# Patient Record
Sex: FEMALE | Race: BLACK OR AFRICAN AMERICAN | NOT HISPANIC OR LATINO | Employment: UNEMPLOYED | URBAN - METROPOLITAN AREA
[De-identification: names, ages, dates, MRNs, and addresses within clinical notes are randomized per-mention and may not be internally consistent; named-entity substitution may affect disease eponyms.]

---

## 2017-07-14 ENCOUNTER — HOSPITAL ENCOUNTER (EMERGENCY)
Facility: HOSPITAL | Age: 57
Discharge: HOME | End: 2017-07-14
Attending: EMERGENCY MEDICINE | Admitting: EMERGENCY MEDICINE
Payer: COMMERCIAL

## 2017-07-14 VITALS
SYSTOLIC BLOOD PRESSURE: 190 MMHG | HEART RATE: 66 BPM | RESPIRATION RATE: 16 BRPM | DIASTOLIC BLOOD PRESSURE: 85 MMHG | WEIGHT: 268.96 LBS | OXYGEN SATURATION: 99 % | TEMPERATURE: 98.5 F

## 2017-07-14 DIAGNOSIS — E16.2 HYPOGLYCEMIA: Primary | ICD-10-CM

## 2017-07-14 LAB
ALBUMIN SERPL BCP-MCNC: 3.4 G/DL (ref 3.5–5)
ALP SERPL-CCNC: 118 U/L (ref 46–116)
ALT SERPL W P-5'-P-CCNC: 18 U/L (ref 12–78)
ANION GAP SERPL CALCULATED.3IONS-SCNC: 11 MMOL/L (ref 4–13)
AST SERPL W P-5'-P-CCNC: 16 U/L (ref 5–45)
BASOPHILS # BLD AUTO: 0.04 THOUSANDS/ΜL (ref 0–0.1)
BASOPHILS NFR BLD AUTO: 0 % (ref 0–1)
BILIRUB SERPL-MCNC: 0.2 MG/DL (ref 0.2–1)
BUN SERPL-MCNC: 79 MG/DL (ref 5–25)
CALCIUM SERPL-MCNC: 9.4 MG/DL (ref 8.3–10.1)
CHLORIDE SERPL-SCNC: 108 MMOL/L (ref 100–108)
CO2 SERPL-SCNC: 21 MMOL/L (ref 21–32)
CREAT SERPL-MCNC: 5.58 MG/DL (ref 0.6–1.3)
EOSINOPHIL # BLD AUTO: 0.17 THOUSAND/ΜL (ref 0–0.61)
EOSINOPHIL NFR BLD AUTO: 2 % (ref 0–6)
ERYTHROCYTE [DISTWIDTH] IN BLOOD BY AUTOMATED COUNT: 13.6 % (ref 11.6–15.1)
GFR SERPL CREATININE-BSD FRML MDRD: 9.5 ML/MIN/1.73SQ M
GLUCOSE SERPL-MCNC: 141 MG/DL (ref 65–140)
HCT VFR BLD AUTO: 34.2 % (ref 34.8–46.1)
HGB BLD-MCNC: 10.5 G/DL (ref 11.5–15.4)
LYMPHOCYTES # BLD AUTO: 2.02 THOUSANDS/ΜL (ref 0.6–4.47)
LYMPHOCYTES NFR BLD AUTO: 22 % (ref 14–44)
MCH RBC QN AUTO: 28.6 PG (ref 26.8–34.3)
MCHC RBC AUTO-ENTMCNC: 30.7 G/DL (ref 31.4–37.4)
MCV RBC AUTO: 93 FL (ref 82–98)
MONOCYTES # BLD AUTO: 0.45 THOUSAND/ΜL (ref 0.17–1.22)
MONOCYTES NFR BLD AUTO: 5 % (ref 4–12)
NEUTROPHILS # BLD AUTO: 6.59 THOUSANDS/ΜL (ref 1.85–7.62)
NEUTS SEG NFR BLD AUTO: 71 % (ref 43–75)
NRBC BLD AUTO-RTO: 0 /100 WBCS
PLATELET # BLD AUTO: 250 THOUSANDS/UL (ref 149–390)
PMV BLD AUTO: 10.2 FL (ref 8.9–12.7)
POTASSIUM SERPL-SCNC: 5.1 MMOL/L (ref 3.5–5.3)
PROT SERPL-MCNC: 7.9 G/DL (ref 6.4–8.2)
RBC # BLD AUTO: 3.67 MILLION/UL (ref 3.81–5.12)
SODIUM SERPL-SCNC: 140 MMOL/L (ref 136–145)
WBC # BLD AUTO: 9.32 THOUSAND/UL (ref 4.31–10.16)

## 2017-07-14 PROCEDURE — 36415 COLL VENOUS BLD VENIPUNCTURE: CPT | Performed by: PHYSICIAN ASSISTANT

## 2017-07-14 PROCEDURE — 85025 COMPLETE CBC W/AUTO DIFF WBC: CPT | Performed by: PHYSICIAN ASSISTANT

## 2017-07-14 PROCEDURE — 93005 ELECTROCARDIOGRAM TRACING: CPT | Performed by: PHYSICIAN ASSISTANT

## 2017-07-14 PROCEDURE — 99284 EMERGENCY DEPT VISIT MOD MDM: CPT

## 2017-07-14 PROCEDURE — 80053 COMPREHEN METABOLIC PANEL: CPT | Performed by: PHYSICIAN ASSISTANT

## 2017-07-14 RX ORDER — AMLODIPINE BESYLATE 5 MG/1
5 TABLET ORAL DAILY
COMMUNITY
End: 2017-11-29 | Stop reason: HOSPADM

## 2017-07-14 RX ORDER — ATORVASTATIN CALCIUM 40 MG/1
40 TABLET, FILM COATED ORAL DAILY
COMMUNITY
End: 2017-11-29 | Stop reason: HOSPADM

## 2017-07-14 RX ORDER — CARVEDILOL 6.25 MG/1
6.25 TABLET ORAL 2 TIMES DAILY WITH MEALS
COMMUNITY
End: 2017-11-29 | Stop reason: HOSPADM

## 2017-07-18 LAB
ATRIAL RATE: 64 BPM
P AXIS: 71 DEGREES
PR INTERVAL: 170 MS
QRS AXIS: 15 DEGREES
QRSD INTERVAL: 84 MS
QT INTERVAL: 416 MS
QTC INTERVAL: 429 MS
T WAVE AXIS: 54 DEGREES
VENTRICULAR RATE: 64 BPM

## 2017-11-25 ENCOUNTER — HOSPITAL ENCOUNTER (INPATIENT)
Facility: HOSPITAL | Age: 57
LOS: 4 days | Discharge: HOME HEALTH | DRG: 045 | End: 2017-11-29
Attending: EMERGENCY MEDICINE | Admitting: ANESTHESIOLOGY
Payer: COMMERCIAL

## 2017-11-25 ENCOUNTER — APPOINTMENT (EMERGENCY)
Dept: CT IMAGING | Facility: HOSPITAL | Age: 57
DRG: 045 | End: 2017-11-25
Payer: COMMERCIAL

## 2017-11-25 ENCOUNTER — APPOINTMENT (EMERGENCY)
Dept: RADIOLOGY | Facility: HOSPITAL | Age: 57
DRG: 045 | End: 2017-11-25
Payer: COMMERCIAL

## 2017-11-25 DIAGNOSIS — N17.9 AKI (ACUTE KIDNEY INJURY) (HCC): ICD-10-CM

## 2017-11-25 DIAGNOSIS — E87.5 ACUTE HYPERKALEMIA: ICD-10-CM

## 2017-11-25 DIAGNOSIS — N17.9 ACUTE ON CHRONIC KIDNEY FAILURE (HCC): ICD-10-CM

## 2017-11-25 DIAGNOSIS — N18.9 ACUTE ON CHRONIC KIDNEY FAILURE (HCC): ICD-10-CM

## 2017-11-25 DIAGNOSIS — N17.9 ACUTE KIDNEY FAILURE (HCC): ICD-10-CM

## 2017-11-25 DIAGNOSIS — I63.9 STROKE (CEREBRUM) (HCC): ICD-10-CM

## 2017-11-25 DIAGNOSIS — I63.9 CVA (CEREBRAL VASCULAR ACCIDENT) (HCC): Primary | ICD-10-CM

## 2017-11-25 PROBLEM — E78.5 HLD (HYPERLIPIDEMIA): Status: ACTIVE | Noted: 2017-11-25

## 2017-11-25 PROBLEM — N39.0 UTI (URINARY TRACT INFECTION): Status: ACTIVE | Noted: 2017-11-25

## 2017-11-25 PROBLEM — I10 ESSENTIAL HYPERTENSION: Status: ACTIVE | Noted: 2017-11-25

## 2017-11-25 PROBLEM — E10.9 TYPE 1 DIABETES MELLITUS (HCC): Status: ACTIVE | Noted: 2017-11-25

## 2017-11-25 LAB
ALBUMIN SERPL BCP-MCNC: 3.5 G/DL (ref 3.5–5)
ALP SERPL-CCNC: 172 U/L (ref 46–116)
ALT SERPL W P-5'-P-CCNC: 17 U/L (ref 12–78)
ANION GAP SERPL CALCULATED.3IONS-SCNC: 12 MMOL/L (ref 4–13)
APTT PPP: 28 SECONDS (ref 23–35)
AST SERPL W P-5'-P-CCNC: 29 U/L (ref 5–45)
BACTERIA UR QL AUTO: ABNORMAL /HPF
BASOPHILS # BLD AUTO: 0.04 THOUSANDS/ΜL (ref 0–0.1)
BASOPHILS NFR BLD AUTO: 0 % (ref 0–1)
BILIRUB SERPL-MCNC: 0.4 MG/DL (ref 0.2–1)
BILIRUB UR QL STRIP: NEGATIVE
BUN SERPL-MCNC: 58 MG/DL (ref 5–25)
CALCIUM SERPL-MCNC: 9.4 MG/DL (ref 8.3–10.1)
CHLORIDE SERPL-SCNC: 107 MMOL/L (ref 100–108)
CHLORIDE UR-SCNC: 101 MMOL/L (ref 10–330)
CHOLEST SERPL-MCNC: 276 MG/DL (ref 50–200)
CLARITY UR: ABNORMAL
CLARITY, POC: CLEAR
CO2 SERPL-SCNC: 23 MMOL/L (ref 21–32)
COLOR UR: COLORLESS
COLOR, POC: YELLOW
CREAT SERPL-MCNC: 6.35 MG/DL (ref 0.6–1.3)
CREAT UR-MCNC: 34.1 MG/DL
CREAT UR-MCNC: 37.3 MG/DL
EOSINOPHIL # BLD AUTO: 0.09 THOUSAND/ΜL (ref 0–0.61)
EOSINOPHIL NFR BLD AUTO: 1 % (ref 0–6)
ERYTHROCYTE [DISTWIDTH] IN BLOOD BY AUTOMATED COUNT: 13.6 % (ref 11.6–15.1)
EXT BILIRUBIN, UA: NEGATIVE
EXT BLOOD URINE: ABNORMAL
EXT GLUCOSE, UA: 250
EXT KETONES: ABNORMAL
EXT NITRITE, UA: NEGATIVE
EXT PH, UA: 7.5
EXT PROTEIN, UA: 2000
EXT SPECIFIC GRAVITY, UA: 1.01
EXT UROBILINOGEN: NEGATIVE
GFR SERPL CREATININE-BSD FRML MDRD: 8 ML/MIN/1.73SQ M
GLUCOSE SERPL-MCNC: 161 MG/DL (ref 65–140)
GLUCOSE SERPL-MCNC: 165 MG/DL (ref 65–140)
GLUCOSE SERPL-MCNC: 206 MG/DL (ref 65–140)
GLUCOSE SERPL-MCNC: 245 MG/DL (ref 65–140)
GLUCOSE UR STRIP-MCNC: ABNORMAL MG/DL
HCT VFR BLD AUTO: 33.7 % (ref 34.8–46.1)
HDLC SERPL-MCNC: 40 MG/DL (ref 40–60)
HGB BLD-MCNC: 10.8 G/DL (ref 11.5–15.4)
HGB UR QL STRIP.AUTO: ABNORMAL
INR PPP: 1 (ref 0.86–1.16)
KETONES UR STRIP-MCNC: NEGATIVE MG/DL
LDLC SERPL CALC-MCNC: 176 MG/DL (ref 0–100)
LEUKOCYTE ESTERASE UR QL STRIP: ABNORMAL
LYMPHOCYTES # BLD AUTO: 1.73 THOUSANDS/ΜL (ref 0.6–4.47)
LYMPHOCYTES NFR BLD AUTO: 17 % (ref 14–44)
MCH RBC QN AUTO: 28.9 PG (ref 26.8–34.3)
MCHC RBC AUTO-ENTMCNC: 32 G/DL (ref 31.4–37.4)
MCV RBC AUTO: 90 FL (ref 82–98)
MICROALBUMIN UR-MCNC: 2000 MG/L (ref 0–20)
MICROALBUMIN/CREAT 24H UR: 5865 MG/G CREATININE (ref 0–30)
MONOCYTES # BLD AUTO: 0.35 THOUSAND/ΜL (ref 0.17–1.22)
MONOCYTES NFR BLD AUTO: 4 % (ref 4–12)
NEUTROPHILS # BLD AUTO: 7.66 THOUSANDS/ΜL (ref 1.85–7.62)
NEUTS SEG NFR BLD AUTO: 77 % (ref 43–75)
NITRITE UR QL STRIP: NEGATIVE
NON-SQ EPI CELLS URNS QL MICRO: ABNORMAL /HPF
NRBC BLD AUTO-RTO: 0 /100 WBCS
OSMOLALITY UR: 305 MMOL/KG
PH UR STRIP.AUTO: 7 [PH] (ref 4.5–8)
PLATELET # BLD AUTO: 227 THOUSANDS/UL (ref 149–390)
PLATELET # BLD AUTO: 259 THOUSANDS/UL (ref 149–390)
PMV BLD AUTO: 10 FL (ref 8.9–12.7)
PMV BLD AUTO: 9.3 FL (ref 8.9–12.7)
POTASSIUM SERPL-SCNC: 6.4 MMOL/L (ref 3.5–5.3)
POTASSIUM UR-SCNC: 14.6 MMOL/L (ref 1–300)
PROT SERPL-MCNC: 8 G/DL (ref 6.4–8.2)
PROT UR STRIP-MCNC: >=300 MG/DL
PROTHROMBIN TIME: 13.4 SECONDS (ref 12.1–14.4)
RBC # BLD AUTO: 3.74 MILLION/UL (ref 3.81–5.12)
RBC #/AREA URNS AUTO: ABNORMAL /HPF
SODIUM 24H UR-SCNC: 95 MOL/L
SODIUM 24H UR-SCNC: 99 MOL/L
SODIUM SERPL-SCNC: 142 MMOL/L (ref 136–145)
SP GR UR STRIP.AUTO: 1.01 (ref 1–1.03)
TRIGL SERPL-MCNC: 300 MG/DL
TROPONIN I SERPL-MCNC: 0.02 NG/ML
TSH SERPL DL<=0.05 MIU/L-ACNC: 4.7 UIU/ML (ref 0.36–3.74)
UROBILINOGEN UR QL STRIP.AUTO: 0.2 E.U./DL
UUN 24H UR-MCNC: 199 MG/DL
WBC # BLD AUTO: 9.92 THOUSAND/UL (ref 4.31–10.16)
WBC # BLD EST: ABNORMAL 10*3/UL
WBC #/AREA URNS AUTO: ABNORMAL /HPF
WBC CLUMPS # UR AUTO: PRESENT /UL

## 2017-11-25 PROCEDURE — 84540 ASSAY OF URINE/UREA-N: CPT | Performed by: INTERNAL MEDICINE

## 2017-11-25 PROCEDURE — 93005 ELECTROCARDIOGRAM TRACING: CPT | Performed by: EMERGENCY MEDICINE

## 2017-11-25 PROCEDURE — 84300 ASSAY OF URINE SODIUM: CPT | Performed by: INTERNAL MEDICINE

## 2017-11-25 PROCEDURE — 82570 ASSAY OF URINE CREATININE: CPT | Performed by: NURSE PRACTITIONER

## 2017-11-25 PROCEDURE — 84133 ASSAY OF URINE POTASSIUM: CPT | Performed by: NURSE PRACTITIONER

## 2017-11-25 PROCEDURE — 36415 COLL VENOUS BLD VENIPUNCTURE: CPT | Performed by: EMERGENCY MEDICINE

## 2017-11-25 PROCEDURE — 81001 URINALYSIS AUTO W/SCOPE: CPT | Performed by: INTERNAL MEDICINE

## 2017-11-25 PROCEDURE — 85610 PROTHROMBIN TIME: CPT | Performed by: EMERGENCY MEDICINE

## 2017-11-25 PROCEDURE — 82948 REAGENT STRIP/BLOOD GLUCOSE: CPT

## 2017-11-25 PROCEDURE — 82436 ASSAY OF URINE CHLORIDE: CPT | Performed by: NURSE PRACTITIONER

## 2017-11-25 PROCEDURE — 86705 HEP B CORE ANTIBODY IGM: CPT | Performed by: INTERNAL MEDICINE

## 2017-11-25 PROCEDURE — 84300 ASSAY OF URINE SODIUM: CPT | Performed by: NURSE PRACTITIONER

## 2017-11-25 PROCEDURE — 86704 HEP B CORE ANTIBODY TOTAL: CPT | Performed by: INTERNAL MEDICINE

## 2017-11-25 PROCEDURE — 80061 LIPID PANEL: CPT | Performed by: NURSE PRACTITIONER

## 2017-11-25 PROCEDURE — 82043 UR ALBUMIN QUANTITATIVE: CPT | Performed by: INTERNAL MEDICINE

## 2017-11-25 PROCEDURE — 86803 HEPATITIS C AB TEST: CPT | Performed by: INTERNAL MEDICINE

## 2017-11-25 PROCEDURE — 71020 HB CHEST X-RAY 2VW FRONTAL&LATL: CPT

## 2017-11-25 PROCEDURE — 87086 URINE CULTURE/COLONY COUNT: CPT | Performed by: NURSE PRACTITIONER

## 2017-11-25 PROCEDURE — 81002 URINALYSIS NONAUTO W/O SCOPE: CPT | Performed by: EMERGENCY MEDICINE

## 2017-11-25 PROCEDURE — 70450 CT HEAD/BRAIN W/O DYE: CPT

## 2017-11-25 PROCEDURE — 85049 AUTOMATED PLATELET COUNT: CPT | Performed by: NURSE PRACTITIONER

## 2017-11-25 PROCEDURE — 96360 HYDRATION IV INFUSION INIT: CPT

## 2017-11-25 PROCEDURE — 84484 ASSAY OF TROPONIN QUANT: CPT | Performed by: EMERGENCY MEDICINE

## 2017-11-25 PROCEDURE — 83935 ASSAY OF URINE OSMOLALITY: CPT | Performed by: NURSE PRACTITIONER

## 2017-11-25 PROCEDURE — 87340 HEPATITIS B SURFACE AG IA: CPT | Performed by: INTERNAL MEDICINE

## 2017-11-25 PROCEDURE — 80053 COMPREHEN METABOLIC PANEL: CPT | Performed by: EMERGENCY MEDICINE

## 2017-11-25 PROCEDURE — 85730 THROMBOPLASTIN TIME PARTIAL: CPT | Performed by: EMERGENCY MEDICINE

## 2017-11-25 PROCEDURE — 86706 HEP B SURFACE ANTIBODY: CPT | Performed by: INTERNAL MEDICINE

## 2017-11-25 PROCEDURE — 99285 EMERGENCY DEPT VISIT HI MDM: CPT

## 2017-11-25 PROCEDURE — 82570 ASSAY OF URINE CREATININE: CPT | Performed by: INTERNAL MEDICINE

## 2017-11-25 PROCEDURE — 84443 ASSAY THYROID STIM HORMONE: CPT | Performed by: EMERGENCY MEDICINE

## 2017-11-25 PROCEDURE — 85025 COMPLETE CBC W/AUTO DIFF WBC: CPT | Performed by: EMERGENCY MEDICINE

## 2017-11-25 RX ORDER — CARVEDILOL 6.25 MG/1
6.25 TABLET ORAL 2 TIMES DAILY WITH MEALS
Status: DISCONTINUED | OUTPATIENT
Start: 2017-11-25 | End: 2017-11-26

## 2017-11-25 RX ORDER — ATORVASTATIN CALCIUM 40 MG/1
80 TABLET, FILM COATED ORAL
Status: DISCONTINUED | OUTPATIENT
Start: 2017-11-25 | End: 2017-11-29 | Stop reason: HOSPADM

## 2017-11-25 RX ORDER — SODIUM POLYSTYRENE SULFONATE 15 G/60ML
30 SUSPENSION ORAL; RECTAL ONCE
Status: COMPLETED | OUTPATIENT
Start: 2017-11-25 | End: 2017-11-25

## 2017-11-25 RX ORDER — AMLODIPINE BESYLATE 5 MG/1
5 TABLET ORAL DAILY
Status: DISCONTINUED | OUTPATIENT
Start: 2017-11-26 | End: 2017-11-26

## 2017-11-25 RX ORDER — HYDRALAZINE HYDROCHLORIDE 20 MG/ML
10 INJECTION INTRAMUSCULAR; INTRAVENOUS ONCE
Status: COMPLETED | OUTPATIENT
Start: 2017-11-25 | End: 2017-11-25

## 2017-11-25 RX ORDER — HEPARIN SODIUM 5000 [USP'U]/ML
7500 INJECTION, SOLUTION INTRAVENOUS; SUBCUTANEOUS EVERY 8 HOURS SCHEDULED
Status: DISCONTINUED | OUTPATIENT
Start: 2017-11-25 | End: 2017-11-29 | Stop reason: HOSPADM

## 2017-11-25 RX ORDER — CIPROFLOXACIN 250 MG/1
250 TABLET, FILM COATED ORAL
Status: DISCONTINUED | OUTPATIENT
Start: 2017-11-25 | End: 2017-11-28

## 2017-11-25 RX ORDER — CIPROFLOXACIN 500 MG/1
500 TABLET, FILM COATED ORAL EVERY 12 HOURS SCHEDULED
Status: DISCONTINUED | OUTPATIENT
Start: 2017-11-25 | End: 2017-11-25

## 2017-11-25 RX ORDER — CLONIDINE HYDROCHLORIDE 0.1 MG/1
0.1 TABLET ORAL ONCE
Status: COMPLETED | OUTPATIENT
Start: 2017-11-25 | End: 2017-11-25

## 2017-11-25 RX ORDER — DEXTROSE MONOHYDRATE 25 G/50ML
25 INJECTION, SOLUTION INTRAVENOUS ONCE
Status: COMPLETED | OUTPATIENT
Start: 2017-11-25 | End: 2017-11-25

## 2017-11-25 RX ORDER — CHLORHEXIDINE GLUCONATE 0.12 MG/ML
15 RINSE ORAL EVERY 12 HOURS SCHEDULED
Status: DISCONTINUED | OUTPATIENT
Start: 2017-11-25 | End: 2017-11-29 | Stop reason: HOSPADM

## 2017-11-25 RX ADMIN — CHLORHEXIDINE GLUCONATE 15 ML: 1.2 RINSE ORAL at 20:42

## 2017-11-25 RX ADMIN — CALCIUM GLUCONATE 1 G: 94 INJECTION, SOLUTION INTRAVENOUS at 17:57

## 2017-11-25 RX ADMIN — HEPARIN SODIUM 7500 UNITS: 5000 INJECTION, SOLUTION INTRAVENOUS; SUBCUTANEOUS at 18:11

## 2017-11-25 RX ADMIN — HEPARIN SODIUM 7500 UNITS: 5000 INJECTION, SOLUTION INTRAVENOUS; SUBCUTANEOUS at 22:26

## 2017-11-25 RX ADMIN — CARVEDILOL 6.25 MG: 6.25 TABLET, FILM COATED ORAL at 18:06

## 2017-11-25 RX ADMIN — SODIUM POLYSTYRENE SULFONATE 30 G: 15 SUSPENSION ORAL; RECTAL at 16:23

## 2017-11-25 RX ADMIN — INSULIN LISPRO 1 UNITS: 100 INJECTION, SOLUTION INTRAVENOUS; SUBCUTANEOUS at 18:54

## 2017-11-25 RX ADMIN — ATORVASTATIN CALCIUM 80 MG: 40 TABLET, FILM COATED ORAL at 18:07

## 2017-11-25 RX ADMIN — CIPROFLOXACIN HYDROCHLORIDE 250 MG: 250 TABLET, FILM COATED ORAL at 18:06

## 2017-11-25 RX ADMIN — SODIUM CHLORIDE 1000 ML: 0.9 INJECTION, SOLUTION INTRAVENOUS at 12:46

## 2017-11-25 RX ADMIN — INSULIN DETEMIR 10 UNITS: 100 INJECTION, SOLUTION SUBCUTANEOUS at 22:27

## 2017-11-25 RX ADMIN — INSULIN LISPRO 1 UNITS: 100 INJECTION, SOLUTION INTRAVENOUS; SUBCUTANEOUS at 22:27

## 2017-11-25 RX ADMIN — INSULIN HUMAN 10 UNITS: 100 INJECTION, SOLUTION PARENTERAL at 17:59

## 2017-11-25 RX ADMIN — HYDRALAZINE HYDROCHLORIDE 10 MG: 20 INJECTION INTRAMUSCULAR; INTRAVENOUS at 21:00

## 2017-11-25 RX ADMIN — CLONIDINE HYDROCHLORIDE 0.1 MG: 0.1 TABLET ORAL at 16:21

## 2017-11-25 RX ADMIN — DEXTROSE MONOHYDRATE 25 ML: 500 INJECTION PARENTERAL at 17:59

## 2017-11-25 NOTE — H&P
History and Physical - Critical Care   Orlando Lucas 59 y o  female MRN: [id number]  Unit/Bed#: ED 25 Encounter: [id number]    Reason for Admission / Chief Complaint:  Right upper extremity weakness    History of Present Illness:  Orlando Lucas is a 59 y o  female with past medical history significant for diabetes on insulin, hyperlipidemia, and hypertension who presents with a one-week history of right upper extremity weakness and no associated symptoms  During her emergency room evaluation, it was discovered that she has acute kidney injury on CKD of unknown severity, and hyperkalemia  She is hemodynamically stable and her EKG does not manifest her hyperkalemia  CT brain revealed a subacute left parietal stroke in evolution  Patient states she last saw her PCP 4 months ago, but is unsure of her name  She is unable to tell me her normal glucose range or medications  History obtained from chart review, the patient and   Patient denies any knowledge of atrial fibrillation  Past Medical History:  Past Medical History:   Diagnosis Date    [facility]     CKD (chronic kidney disease)     Diabetes mellitus ([facility] )     type II    Hyperlipidemia     Hypertension        Past Surgical History:  No past surgical history on file  Past Family History:  No family history on file      Social History:  History   Smoking Status    Never Smoker   Smokeless Tobacco    Not on file     History   Alcohol Use No     History   Drug Use No     Marital Status: /Civil Union      Medications:  Current Facility-Administered Medications   Medication Dose Route Frequency    calcium gluconate 1 g in sodium chloride 0 9 % 100 mL IVPB  1 g Intravenous Once    chlorhexidine (PERIDEX) 0 12 % oral rinse 15 mL  15 mL Swish & Spit Q12H [address]    dextrose 50 % IV solution 25 mL  25 mL Intravenous Once    heparin (porcine) subcutaneous injection 7,500 Units  7,500 Units Subcutaneous Q8H [address]    insulin regular (HumuLIN R,NovoLIN R) injection 10 Units  10 Units Subcutaneous Once     Home medications:  Prior to Admission medications    Medication Sig Start Date End Date Taking? Authorizing Provider   amLODIPine (NORVASC) 5 mg tablet Take 5 mg by mouth daily   Yes Historical Provider, MD   atorvastatin (LIPITOR) 40 mg tablet Take 40 mg by mouth daily   Yes Historical Provider, MD   carvedilol (COREG) 6 25 mg tablet Take 6 25 mg by mouth 2 (two) times a day with meals   Yes Historical Provider, MD   insulin detemir (LEVEMIR) 100 units/mL subcutaneous injection Inject 10 Units under the skin daily at bedtime   Yes Historical Provider, MD   insulin lispro (HumaLOG) 100 units/mL injection Inject under the skin 3 (three) times a day before meals   Yes Historical Provider, MD     Allergies:  No Known Allergies    ROS:   Review of Systems   HENT: Negative  Eyes: Negative  Respiratory: Negative  Cardiovascular: Negative  Gastrointestinal: Negative  Endocrine: Negative  Genitourinary: Negative  Musculoskeletal: Negative  Skin: Negative  Allergic/Immunologic: Negative  Neurological: Positive for weakness  Hematological: Negative  Psychiatric/Behavioral: Positive for decreased concentration  Vitals:  Vitals:    17 1132 17 1136 17 1530 17 1625   BP:  112/78 (!) 197/76 (!) 247/111   Pulse:   73 72   Resp:   19 18   Temp: 99 6 °F (37 6 °C)      TempSrc: Oral      SpO2:   99% 99%   Weight:       Height:         Temperature:   Temp (24hrs), Av 6 °F (37 6 °C), Min:99 6 °F (37 6 °C), Max:99 6 °F (37 6 °C)    Current Temperature: 99 6 °F (37 6 °C)    Weights:   IBW: 73 1 kg  Body mass index is 37 91 kg/m²      Hemodynamic Monitoring:  N/A     Non-Invasive/Invasive Ventilation Settings:  Respiratory    Lab Data (Last 4 hours)    None         O2/Vent Data (Last 4 hours)    None              No results found for: PHART, XRO6XPG, PO2ART, NYB7MZT, Y0SRMYEV, BEART, SOURCE  SpO2: SpO2: 99 %     Physical Exam:  Physical Exam   Constitutional: She is oriented to person, place, and time  She appears well-developed and well-nourished  No distress  HENT:   Head: Normocephalic and atraumatic  Nose: Nose normal    Mouth/Throat: Oropharynx is clear and moist    Eyes: Conjunctivae and EOM are normal  Pupils are equal, round, and reactive to light  No scleral icterus  Neck: Normal range of motion  Neck supple  No JVD present  No tracheal deviation present  Cardiovascular: Normal rate, regular rhythm, normal heart sounds and intact distal pulses  Exam reveals no gallop and no friction rub  No murmur heard  Pulmonary/Chest: Effort normal and breath sounds normal    Abdominal: Soft  Bowel sounds are normal  She exhibits no distension  There is no tenderness  There is no rebound and no guarding  Musculoskeletal: Normal range of motion  She exhibits no edema, tenderness or deformity  Neurological: She is alert and oriented to person, place, and time  She has normal strength  She displays no tremor  No cranial nerve deficit or sensory deficit  She exhibits normal muscle tone  She displays no seizure activity  Coordination and gait normal  GCS eye subscore is 4  GCS verbal subscore is 5  GCS motor subscore is 6  She displays no Babinski's sign on the right side  She displays no Babinski's sign on the left side  Slow to respond to questions at times  Skin: Skin is warm and dry  No rash noted  No pallor  Psychiatric: Her speech is normal and behavior is normal  Thought content normal  Her affect is blunt  Nursing note and vitals reviewed        Labs:    Results from last 7 days  Lab Units 11/25/17  1218   WBC Thousand/uL 9 92   HEMOGLOBIN g/dL 10 8*   HEMATOCRIT % 33 7*   PLATELETS Thousands/uL 259   NEUTROS PCT % 77*   MONOS PCT % 4      Results from last 7 days  Lab Units 11/25/17  1245   SODIUM mmol/L 142   POTASSIUM mmol/L 6 4*   CHLORIDE mmol/L 107   CO2 mmol/L 23 BUN mg/dL 58*   CREATININE mg/dL 6 35*   CALCIUM mg/dL 9 4   TOTAL PROTEIN g/dL 8 0   BILIRUBIN TOTAL mg/dL 0 40   ALK PHOS U/L 172*   ALT U/L 17   AST U/L 29   GLUCOSE RANDOM mg/dL 245*                Results from last 7 days  Lab Units 11/25/17  1218   INR  1 00   PTT seconds 28           0  Lab Value Date/Time   TROPONINI 0 02 11/25/2017 1218       Imaging:  XR chest 2 views   Final Result      No active pulmonary disease  Workstation performed: [id number]         CT head without contrast   Final Result      Apparent evolving nonhemorrhagic left posterior parietal infarct  Old left basal ganglia infarct with adjacent white matter changes of uncertain chronicity  Workstation performed: [id number]         US retroperitoneal complete    (Results Pending)   IR consult    (Results Pending)   VAS carotid complete study    (Results Pending)   VAS lower limb venous duplex study, complete bilateral    (Results Pending)      I have personally reviewed pertinent reports  EKG: SR with PACs rate 80  No STEMI  This was personally reviewed by myself  Micro:  No results found for: Lily Stevens Lobe    ______________________________________________________________________    Assessment:   Patient Active Problem List   Diagnosis    [facility] on [facility]    Hyperkalemia    Essential hypertension    HLD (hyperlipidemia)    Type 1 diabetes mellitus (HCC)    UTI (urinary tract infection)    Stroke (cerebrum) ([facility] )           Plan:      Neuro: Will place neuro consultation in for the morning  No role for tPA at present given the duration of time between onset and presentation  Serial neuro exams  CV:  Sinus rhythm with PACs on the monitor  Continue to monitor for arrhythmias and/or AFib  Control hypertension with parental agents once IV access established  Single dose of p o  clonidine given for reduction at present    Prevailing blood pressure 250/150, recommend no more than 25% reduction  Pulm:  No active issues at present  Encourage incentive spirometry to prevent atelectasis  Mobilize out of bed as able  GI:  No active issues at present  Tolerating p o  :  UTI present on admission  Will start Cipro p o  for said infection  Will send urine culture  Acute on chronic renal failure being addressed by Nephrology  Extensive discussion with patient and her  in regards to the expectations of hemodialysis  Advised them that this may be a long-term process, however will require further workup to evaluate for same  Patient is unsure that she wants to make the commitment to long-term dialysis  Provided her the option of a short-term dialysis to determine ability to recover, possibility of long-term dialysis, or absence from dialysis entirely  Patient wishes to think about her options overnight, determining quality of life versus prolonging her life  She likes to travel and feels that dialysis may not be compatible with her spontaneous lifestyle  F/E/N:  Gentle hydration once IV established  ID:  Cipro for UTI  Monitor cultures  Heme:  Hemoglobin and platelets stable  Endo:  AC and HS sugars, insulin protocol  Elevated TSH, awaiting T4 reflex  Will defer treating for hypothyroidism, as this may represent sick euthyroid  Msk/Skin:  No issues identified  Disposition:  Admit to step-down    Counseling / Coordination of Care  Total Critical Care time spent 45 minutes excluding procedures, teaching and family updates  ______________________________________________________________________    VTE Pharmacologic Prophylaxis: Heparin  VTE Mechanical Prophylaxis: sequential compression device    Invasive lines and devices:   Invasive Devices          No matching active lines, drains, or airways          Code Status: Level 1 - Full Code  POA:    POLST:      Given critical illness, patient length of stay will require greater than two midnights  Portions of the record may have been created with voice recognition software  Occasional wrong word or "sound a like" substitutions may have occurred due to the inherent limitations of voice recognition software  Read the chart carefully and recognize, using context, where substitutions have occurred        KATIE Ramsey

## 2017-11-25 NOTE — ED NOTES
2 IV attempts unsuccessful, Dr Roge Childress also tried via ultrasound without success        Jose Clements, RN  11/25/17 6599

## 2017-11-25 NOTE — PROGRESS NOTES
Discussed overall case again few minutes earlier and now patient is agreeable to proceeding to dialysis  Dialysis procedure has been fully reviewed with the patient her  and written informed consent has been obtained from patient's  (as patient can't sign the form due to RUE weakness)    Will get patient admitted to ICU team for placement of temporary dialysis catheter  Plan 1st dialysis treatment tomorrow around 11,00 a m     Will also consult IR for placement of tunneled dialysis catheter early next week  Will also consult  to find outpatient dialysis unit  Priya Obrien MD  Nephrology  11/25/2017

## 2017-11-25 NOTE — ED PROVIDER NOTES
History  Chief Complaint   Patient presents with    Altered Mental Status     Onset 1 week ago, no neuro hx, confused about medical hx and medications  Accompanied by right hand weakness  69-year-old female presents to the emergency department with multiple complaints  She has had weakness in her right hand for about one week and is now had some confusion including forgetfulness and difficulty with certain words  She is tearful during my exam and unable to provide a useful history  She is able to answer simple questions and follow simple commands  She is oriented to place and person  She is not oriented to the day of the week  The right hand weakness has been unchanged for about one week  Patient's  is unsure if she has become more confused over the past few days  Patient's  notes that he attempted to get her to go to the hospital earlier but she was reticent to do so  History provided by:  Patient   used: No    CVA/TIA-like Symptoms   Presenting symptoms: confusion, incoordination and weakness    Onset quality:  Unable to specify  Timing:  Constant  Progression:  Unchanged  Similar to previous episodes: no    Associated symptoms: no fever, no paresthesias and no seizures        Prior to Admission Medications   Prescriptions Last Dose Informant Patient Reported? Taking?    amLODIPine (NORVASC) 5 mg tablet   Yes Yes   Sig: Take 5 mg by mouth daily   atorvastatin (LIPITOR) 40 mg tablet   Yes Yes   Sig: Take 40 mg by mouth daily   carvedilol (COREG) 6 25 mg tablet   Yes Yes   Sig: Take 6 25 mg by mouth 2 (two) times a day with meals   insulin detemir (LEVEMIR) 100 units/mL subcutaneous injection   Yes Yes   Sig: Inject 10 Units under the skin daily at bedtime   insulin lispro (HumaLOG) 100 units/mL injection   Yes Yes   Sig: Inject under the skin 3 (three) times a day before meals      Facility-Administered Medications: None       Past Medical History:   Diagnosis Date    [facility]     CKD (chronic kidney disease)     Diabetes mellitus ([facility] )     type II    Hyperlipidemia     Hypertension        No past surgical history on file  No family history on file  I have reviewed and agree with the history as documented  Social History   Substance Use Topics    Smoking status: Never Smoker    Smokeless tobacco: Not on file    Alcohol use No        Review of Systems   Constitutional: Negative for fever  Neurological: Positive for weakness and disturbances in coordination  Negative for seizures and paresthesias  Psychiatric/Behavioral: Positive for confusion  Physical Exam  ED Triage Vitals   Temperature Pulse Respirations Blood Pressure SpO2   11/25/17 1132 11/25/17 1128 11/25/17 1128 11/25/17 1136 11/25/17 1128   99 6 °F (37 6 °C) 98 18 112/78 99 %      Temp Source Heart Rate Source Patient Position - Orthostatic VS BP Location FiO2 (%)   11/25/17 1132 11/25/17 1128 11/25/17 1136 11/25/17 1136 --   Oral Monitor Lying Left arm       Pain Score       11/25/17 1128       No Pain           Orthostatic Vital Signs  Vitals:    11/25/17 1128 11/25/17 1136 11/25/17 1530   BP:  112/78 (!) 197/76   Pulse: 98  73   Patient Position - Orthostatic VS:  Lying        Physical Exam   Constitutional: She appears well-developed and well-nourished  HENT:   Head: Normocephalic and atraumatic  Eyes: Conjunctivae and EOM are normal  Pupils are equal, round, and reactive to light  No scleral icterus  Neck: Normal range of motion  Neck supple  No tracheal deviation present  Cardiovascular: Normal rate and regular rhythm  Pulmonary/Chest: Effort normal and breath sounds normal  No respiratory distress  Abdominal: Soft  She exhibits no distension  There is no tenderness  Musculoskeletal: Normal range of motion  She exhibits no tenderness or deformity  Lymphadenopathy:     She has no cervical adenopathy     Neurological:   Patient is oriented to place and person  She is tearful and emotionally labile  She has 5/5 strength in the left upper extremity and bilateral lower extremities   strength in the right upper extremity is 4/5  Sensation is grossly normal  Cranial nerves 2-12 are grossly intact  Skin: Skin is warm and dry  No rash noted  She is not diaphoretic  Psychiatric: She has a normal mood and affect  Vitals reviewed  ED Medications  Medications   insulin regular (HumuLIN R,NovoLIN R) injection 10 Units (not administered)   dextrose 50 % IV solution 25 mL (not administered)   calcium gluconate 1 g in sodium chloride 0 9 % 100 mL IVPB (not administered)   sodium polystyrene sulfonate (KAYEXALATE) oral suspension 30 g (not administered)   cloNIDine (CATAPRES) tablet 0 1 mg (not administered)   chlorhexidine (PERIDEX) 0 12 % oral rinse 15 mL (not administered)   heparin (porcine) subcutaneous injection 7,500 Units (not administered)   sodium chloride 0 9 % bolus 1,000 mL (0 mL Intravenous Stopped 11/25/17 1400)       Diagnostic Studies  Results Reviewed     Procedure Component Value Units Date/Time    Platelet count [[id number]]     Lab Status:  No result Specimen:  Blood     Urine Microscopic [[id number]]  (Abnormal) Collected:  11/25/17 1416    Lab Status:  Final result Specimen:  Urine from Urine, Other Updated:  11/25/17 1440     RBC, UA 0-1 (A) /hpf      WBC, UA 30-50 (A) /hpf      Epithelial Cells Occasional /hpf      Bacteria, UA Moderate (A) /hpf      WBC Clumps Present    Narrative:       Urine microscopic performed on less than 10mL centrifuged urine      Urinalysis with reflex to microscopic [[id number]]  (Abnormal) Collected:  11/25/17 1416    Lab Status:  Final result Specimen:  Urine from Urine, Other Updated:  11/25/17 1429     Color, UA Colorless     Clarity, UA Slightly Cloudy     Specific Gravity, UA 1 015     pH, UA 7 0     Leukocytes, UA Moderate (A)     Nitrite, UA Negative     Protein, UA >=300 (A) mg/dl      Glucose,  (1/4%) (A) mg/dl      Ketones, UA Negative mg/dl      Urobilinogen, UA 0 2 E U /dl      Bilirubin, UA Negative     Blood, UA Moderate (A)    Microalbumin / creatinine urine ratio [[id number]] Collected:  11/25/17 1415    Lab Status: In process Specimen:  Urine from Urine, Clean Catch Updated:  11/25/17 1426    Sodium, urine, random [[id number]] Collected:  11/25/17 1415    Lab Status: In process Specimen:  Urine from Urine, Clean Catch Updated:  11/25/17 1426    Urea nitrogen, urine [[id number]] Collected:  11/25/17 1415    Lab Status:   In process Specimen:  Urine from Urine, Clean Catch Updated:  11/25/17 1426    POCT urinalysis dipstick [[id number]]  (Abnormal) Resulted:  11/25/17 1424    Lab Status:  Final result Specimen:  Urine Updated:  11/25/17 1425     Color, UA yellow     Clarity, UA clear     EXT Glucose,      EXT Bilirubin, UA (Ref: Negative) negative     EXT Ketones, UA (Ref: Negative) trace     EXT Spec Grav, UA 1 010     EXT Blood, UA (Ref: Negative) moderate     EXT pH, UA 7 5     EXT Protein, UA (Ref: Negative) 2,000     EXT Urobilinogen, UA (Ref: 0 2- 1 0) negative     EXT Leukocytes, UA (Ref: Negative) moderate     EXT Nitrite, UA (Ref: Negative) negative    Hepatitis Panel ([facility] Renal [facility]) [[id number]]     Lab Status:  No result Specimen:  Blood     Comprehensive metabolic panel [[id number]]  (Abnormal) Collected:  11/25/17 1245    Lab Status:  Final result Specimen:  Blood from Arm, Left Updated:  11/25/17 1324     Sodium 142 mmol/L      Potassium 6 4 (HH) mmol/L      Chloride 107 mmol/L      CO2 23 mmol/L      Anion Gap 12 mmol/L      BUN 58 (H) mg/dL      Creatinine 6 35 (H) mg/dL      Glucose 245 (H) mg/dL      Calcium 9 4 mg/dL      AST 29 U/L      ALT 17 U/L      Alkaline Phosphatase 172 (H) U/L      Total Protein 8 0 g/dL      Albumin 3 5 g/dL      Total Bilirubin 0 40 mg/dL      eGFR 8 ml/min/1 73sq m     Narrative:         National Kidney Disease Education Program recommendations are as follows:  GFR calculation is accurate only with a steady state creatinine  Chronic Kidney disease less than 60 ml/min/1 73 sq  meters  Kidney failure less than 15 ml/min/1 73 sq  meters  Fingerstick Glucose (POCT) [[id number]]  (Abnormal) Collected:  11/25/17 1323    Lab Status:  Final result Updated:  11/25/17 1324     POC Glucose 206 (H) mg/dl     TSH [[id number]]  (Abnormal) Collected:  11/25/17 1245    Lab Status:  Final result Specimen:  Blood from Arm, Left Updated:  11/25/17 1319     TSH 3RD GENERATON 4 695 (H) uIU/mL     Narrative:         Patients undergoing fluorescein dye angiography may retain small amounts of fluorescein in the body for 48-72 hours post procedure  Samples containing fluorescein can produce falsely depressed TSH values  If the patient had this procedure,a specimen should be resubmitted post fluorescein clearance  The recommended reference ranges for TSH during pregnancy are as follows:  First trimester 0 1 to 2 5 uIU/mL  Second trimester  0 2 to 3 0 uIU/mL  Third trimester 0 3 to 3 0 uIU/m      Troponin I [[id number]]  (Normal) Collected:  11/25/17 1218    Lab Status:  Final result Specimen:  Blood from Arm, Left Updated:  11/25/17 1245     Troponin I 0 02 ng/mL     Narrative:         [employer] Chemistry analyzer 99% cutoff is > 0 04 ng/mL in network labs    o cTnI 99% cutoff is useful only when applied to patients in the clinical setting of myocardial ischemia  o cTnI 99% cutoff should be interpreted in the context of clinical history, ECG findings and possibly cardiac imaging to establish correct diagnosis  o cTnI 99% cutoff may be suggestive but clearly not indicative of a coronary event without the clinical setting of myocardial ischemia      Protime-INR [[id number]]  (Normal) Collected:  11/25/17 1218    Lab Status:  Final result Specimen:  Blood from Arm, Left Updated:  11/25/17 1236     Protime 13 4 seconds      INR 1 00    APTT [[id number]]  (Normal) Collected:  11/25/17 1218 Lab Status:  Final result Specimen:  Blood from Arm, Left Updated:  11/25/17 1236     PTT 28 seconds     Narrative: Therapeutic Heparin Range = 60-90 seconds    CBC and differential [[id number]]  (Abnormal) Collected:  11/25/17 1218    Lab Status:  Final result Specimen:  Blood from Arm, Left Updated:  11/25/17 1226     WBC 9 92 Thousand/uL      RBC 3 74 (L) Million/uL      Hemoglobin 10 8 (L) g/dL      Hematocrit 33 7 (L) %      MCV 90 fL      MCH 28 9 pg      MCHC 32 0 g/dL      RDW 13 6 %      MPV 10 0 fL      Platelets 052 Thousands/uL      nRBC 0 /100 WBCs      Neutrophils Relative 77 (H) %      Lymphocytes Relative 17 %      Monocytes Relative 4 %      Eosinophils Relative 1 %      Basophils Relative 0 %      Neutrophils Absolute 7 66 (H) Thousands/µL      Lymphocytes Absolute 1 73 Thousands/µL      Monocytes Absolute 0 35 Thousand/µL      Eosinophils Absolute 0 09 Thousand/µL      Basophils Absolute 0 04 Thousands/µL                  XR chest 2 views   Final Result by Nancy Nino MD (11/25 1228)      No active pulmonary disease  Workstation performed: [id number]         CT head without contrast   Final Result by SHELLY Church MD (11/25 1217)      Apparent evolving nonhemorrhagic left posterior parietal infarct  Old left basal ganglia infarct with adjacent white matter changes of uncertain chronicity           Workstation performed: [id number]         US retroperitoneal complete    (Results Pending)   IR consult    (Results Pending)              Procedures  ECG 12 Lead Documentation  Date/Time: 11/25/2017 11:48 AM  Performed by: Gerardo Jeter  Authorized by: Gerardo Jeter     ECG reviewed by me, the ED Provider: yes    Patient location:  ED  Previous ECG:     Previous ECG:  Compared to current    Comparison ECG info:  July 2017  Rate:     ECG rate assessment: normal    Rhythm:     Rhythm: sinus rhythm    Ectopy:     Ectopy: PAC    QRS:     QRS axis:  Normal    QRS intervals: Normal  Conduction:     Conduction: normal    ST segments:     ST segments:  Normal  T waves:     T waves: normal    Comments:      Normal sinus rhythm with premature atrial complexes  No acute ST changes  CriticalCare Time  Performed by: Jackson Marroquin  Authorized by: Jacksno Marroquin     Critical care provider statement:     Critical care time (minutes):  61    Critical care time was exclusive of:  Separately billable procedures and treating other patients and teaching time    Critical care was necessary to treat or prevent imminent or life-threatening deterioration of the following conditions:  Metabolic crisis    Critical care was time spent personally by me on the following activities:  Blood draw for specimens, obtaining history from patient or surrogate, development of treatment plan with patient or surrogate, discussions with consultants, evaluation of patient's response to treatment, examination of patient, review of old charts, re-evaluation of patient's condition, ordering and review of radiographic studies, ordering and review of laboratory studies and ordering and performing treatments and interventions    I assumed direction of critical care for this patient from another provider in my specialty: no             Phone Contacts  ED Phone Contact    ED Course  ED Course                                MDM  Number of Diagnoses or Management Options  Acute hyperkalemia: new and requires workup  Acute kidney failure [facility]): new and requires workup  CVA (cerebral vascular accident) [facility]): new and requires workup  Diagnosis management comments:  68-year-old female presents with chief complaint of right hand weakness and confusion  Symptoms have been present for at least a week  In the emergency department, the patient was noted to have a subacute ischemic CVA as well as hyperkalemia and end-stage renal disease    Patient has a history of chronic renal disease and had previously had discussions with some physicians regarding dialysis but had not started treatments yet  She was seen and evaluated by Nephrology while in the emergency department and the recommendation was made that the patient undergo emergent dialysis given her hyperkalemia and renal failure  Patient is also noted to likely have a UTI  Her case was discussed with Critical Care Medicine as well as Nephrology  For the meantime she will be admitted to the ICU for management of her hyperkalemia  Amount and/or Complexity of Data Reviewed  Clinical lab tests: reviewed and ordered  Tests in the radiology section of CPT®: reviewed and ordered  Review and summarize past medical records: yes  Discuss the patient with other providers: yes  Independent visualization of images, tracings, or specimens: yes      CritCare Time    Disposition  Final diagnoses:   CVA (cerebral vascular accident) ([facility] )   Acute hyperkalemia   [facility] ([facility]     Time reflects when diagnosis was documented in both MDM as applicable and the Disposition within this note     Time User Action Codes Description Comment    11/25/2017  1:31 PM Sugey Branch Add [I63 9] CVA (cerebral vascular accident) ([facility] )     11/25/2017  1:32 PM Erskin Gutter L Add [E87 5] Acute hyperkalemia     11/25/2017  1:32 PM Erskin Gutter L Add [N17 9] Acute kidney failure ([facility] )     11/25/2017  4:13 PM Analy Newton Add [N17 9] DALLAS (acute kidney injury) [facility])       ED Disposition     None      Follow-up Information    None       Patient's Medications   Discharge Prescriptions    No medications on file     No discharge procedures on file      ED Provider  Electronically Signed by           Maylin Velasco MD  11/25/17 7171

## 2017-11-25 NOTE — CONSULTS
REFERRING PHYSICIAN: Lynda Jasso MD     REASON FOR THE REFERRAL / CONSULTATION:  Further management of DALLAS and hyperkalemia    DATE OF CONSULTATION / SERVICE:  11/25/17    ADMISSION DIAGNOSIS: <principal problem not specified>     CHIEF COMPLAINT     Have weakness of my right upper extremities    HPI     This is 25-year-old male with a past medical history of diabetes type 2, hypertension, hyperlipidemia who does not see doctors on a regular basis came into the ER for further management of left upper extremities weakness and found to have potassium of 6 4 with creatinine of 6 35 and Nephrology were consulted for further management of hyperkalemia and DALLAS by ER  Patient's  was also present at the time of consultation and history was also obtained from him and all the questions were answered  Patient said that she developed right upper extremities weakness almost 2 weeks back but her weakness has failed to improved and she decided to come to ER for further evaluation  Patient said that she has not been seeing doctors on regular basis  Few months back patient was seen in ER for hypoglycemia and that time patient was also found to have elevated creatinine of 5 58  Patient said that she has seen nephrologist in the past during hospital stay although she does not see any nephrologist on regular basis in the office  I have initially seen and examined patient earlier today and discussed overall case with patient and her   Patient said that her hypertension has been well controlled on current regimen  Patient also history of diabetes and was taking insulin at home but exact control of diabetes has been unknown  Patient also a history of hyperlipidemia and was also been taking Lipitor 40 mg PO daily  Patient currently denies nausea, vomiting, SOB, chest pain, abdominal pain, constipation, diarrhea or rash today      PAST MEDICAL HISTORY     Past Medical History:   Diagnosis Date    Atrial fibrillation ([facility] )     CKD (chronic kidney disease)     Diabetes mellitus ([facility] )     type II    Hyperlipidemia     Hypertension        PAST SURGICAL HISTORY     No past surgical history on file  ALLERGIES     No Known Allergies    SOCIAL HISTORY     History   Alcohol Use No     History   Drug Use No     History   Smoking Status    Never Smoker   Smokeless Tobacco    Not on file       FAMILY HISTORY     No family history on file  CURRENT MEDICATIONS       Current Facility-Administered Medications:     calcium gluconate 1 g in sodium chloride 0 9 % 100 mL IVPB, 1 g, Intravenous, Once, Binh Stern MD    dextrose 50 % IV solution 25 mL, 25 mL, Intravenous, Once, Binh Stern MD    insulin regular (HumuLIN R,NovoLIN R) injection 10 Units, 10 Units, Subcutaneous, Once, Binh Stern MD    sodium polystyrene sulfonate (KAYEXALATE) oral suspension 30 g, 30 g, Oral, Once, Preeti Toth MD    Current Outpatient Prescriptions:     amLODIPine (NORVASC) 5 mg tablet, Take 5 mg by mouth daily, Disp: , Rfl:     atorvastatin (LIPITOR) 40 mg tablet, Take 40 mg by mouth daily, Disp: , Rfl:     carvedilol (COREG) 6 25 mg tablet, Take 6 25 mg by mouth 2 (two) times a day with meals, Disp: , Rfl:     insulin detemir (LEVEMIR) 100 units/mL subcutaneous injection, Inject 10 Units under the skin daily at bedtime, Disp: , Rfl:     insulin lispro (HumaLOG) 100 units/mL injection, Inject under the skin 3 (three) times a day before meals, Disp: , Rfl:     REVIEW OF SYSTEMS     Complete 10 point review of systems were obtained and discussed in length with the patient  Complete review of systems were negative / unremarkable except mentioned in the HPI section       LAB RESULTS          Results from last 7 days  Lab Units 11/25/17  1245 11/25/17  1218   WBC Thousand/uL  --  9 92   HEMOGLOBIN g/dL  --  10 8*   HEMATOCRIT %  --  33 7*   PLATELETS Thousands/uL  --  259   SODIUM mmol/L 142  --    POTASSIUM mmol/L 6 4*  --    CHLORIDE mmol/L 107  --    CO2 mmol/L 23  --    BUN mg/dL 58*  --    CREATININE mg/dL 6 35*  --    EGFR ml/min/1 73sq m 8  --    CALCIUM mg/dL 9 4  --    ALBUMIN g/dL 3 5  --    TOTAL PROTEIN g/dL 8 0  --    GLUCOSE RANDOM mg/dL 245*  --        I have personally reviewed the old medical records and patient's previously known baseline creatinine level is unknown but on 7/14/17 patient was also found to have elevated creatinine of 5 58 with EGFR of 9 5    RADIOLOGY RESULTS     Results for orders placed during the hospital encounter of 11/25/17   XR chest 2 views:  I have personally reviewed the images of the chest x-ray along with radiology report    Narrative CHEST     INDICATION:  Confusion  Right hand weakness    COMPARISON:  None    VIEWS:  Frontal and lateral projections    IMAGES:  2    FINDINGS:         Cardiomediastinal silhouette appears unremarkable  Right diaphragm is elevated  Lungs are clear  No evidence of consolidation pulmonary edema pleural effusion    Visualized osseous structures appear within normal limits for the patient's age  Impression No active pulmonary disease  Workstation performed: [id number]       12 lead EKG done on 11/25/17 showed normal sinus rhythm with ventricular rate 80 per minute  No peaked T-waves seen      OBJECTIVE     Current Weight: Weight - Scale: 127 kg (279 lb 8 7 oz)  Vitals:    11/25/17 1136   BP: 112/78   Pulse:    Resp:    Temp:    SpO2:      No intake or output data in the 24 hours ending 11/25/17 1405    PHYSICAL EXAMINATION     GEN:  Awake and not in acute distress  RS:  Bilateral equal air entry, no wheezing  CVS:  S1-S2 heard  Abdomen:  Soft, nontender, positive bowel sound  Extremities:  No edema, skin is warm on touch  CNS:  Awake and follows command, weakness in left upper extremities  HEENT:  Pink conjunctiva, no JVD, head is atraumatic  Psychiatric:  Normal mood and affect, not suicidal  Musculoskeletal:  No gross bony deformity seen in hands  Lymph Node:  No palpable cervical lymphadenopathy    PLAN / RECOMMENDATIONS      1  DALLAS with hyperkalemia  Multifactorial and suspect underlying worsening CKD stage 5  Upon review of old medical records patient's baseline creatinine level is unknown but on 7/14/17 patient was found to have creatinine of 5 58  Current creatinine is 6 35 with EGFR of 8 with potassium of 6 4  Worsening DALLAS in the setting of hyperkalemia is severe and life-threatening without initiation of dialysis  I have discussed in length with patient and her  regarding importance of initiation of dialysis in the setting of hyperkalemia with worsening renal function  Patient is adamant to go to home without initiation of dialysis and also understands severe and life-threatening complication of worsening renal function with hyperkalemia  I have repeatedly enforce patient to stay in the hospital and consider initiation of dialysis in the setting of hyperkalemia and worsening renal function but patient was adamant against initiation of dialysis and wants to go home  Patient's  was also present at the time of consultation were also agreed with patient's decision  I have discussed overall case with ER physician who has later talked to patient and patient now is agreeing to stay in the hospital but continued to refuse for dialysis and also understand severe and life-threatening complication of worsening renal function and hyperkalemia without initiation of dialysis  Plan to give Kayexalate 30 g PO x1 dose now and recheck potassium level again tomorrow  I will also plan to check urine lytes and renal ultrasound for further evaluation  Will also plan to check renal hepatitis panel with tomorrow's lab  Will consider initiation of dialysis if patient is agreeable in the future    As mentioned earlier patient understands severe and life-threatening complication of worsening renal function without initiation of dialysis    Plan to avoid ACE-I/ARB and NSAID for time being  Thank you for the consultation to participate in patient's care  I have personally discussed my plan with the ER physician and admitting SLIM physician    Janna Moore MD  Nephrology  11/25/2017        Portions of the record may have been created with voice recognition software  Occasional wrong word or "sound a like" substitutions may have occurred due to the inherent limitations of voice recognition software  Read the chart carefully and recognize, using context, where substitutions have occurred

## 2017-11-26 ENCOUNTER — APPOINTMENT (INPATIENT)
Dept: MRI IMAGING | Facility: HOSPITAL | Age: 57
DRG: 045 | End: 2017-11-26
Payer: COMMERCIAL

## 2017-11-26 ENCOUNTER — APPOINTMENT (INPATIENT)
Dept: NON INVASIVE DIAGNOSTICS | Facility: HOSPITAL | Age: 57
DRG: 045 | End: 2017-11-26
Payer: COMMERCIAL

## 2017-11-26 PROBLEM — R80.9 PROTEINURIA: Status: ACTIVE | Noted: 2017-11-26

## 2017-11-26 LAB
ALBUMIN SERPL BCP-MCNC: 3.1 G/DL (ref 3.5–5)
ANION GAP SERPL CALCULATED.3IONS-SCNC: 10 MMOL/L (ref 4–13)
ATRIAL RATE: 80 BPM
BASOPHILS # BLD AUTO: 0.04 THOUSANDS/ΜL (ref 0–0.1)
BASOPHILS NFR BLD AUTO: 0 % (ref 0–1)
BUN SERPL-MCNC: 50 MG/DL (ref 5–25)
C3 SERPL-MCNC: 145 MG/DL (ref 90–180)
C4 SERPL-MCNC: 71 MG/DL (ref 10–40)
CALCIUM SERPL-MCNC: 9.4 MG/DL (ref 8.3–10.1)
CHLORIDE SERPL-SCNC: 108 MMOL/L (ref 100–108)
CO2 SERPL-SCNC: 23 MMOL/L (ref 21–32)
CREAT SERPL-MCNC: 6.22 MG/DL (ref 0.6–1.3)
EOSINOPHIL # BLD AUTO: 0.12 THOUSAND/ΜL (ref 0–0.61)
EOSINOPHIL NFR BLD AUTO: 1 % (ref 0–6)
ERYTHROCYTE [DISTWIDTH] IN BLOOD BY AUTOMATED COUNT: 13.4 % (ref 11.6–15.1)
EST. AVERAGE GLUCOSE BLD GHB EST-MCNC: 220 MG/DL
FERRITIN SERPL-MCNC: 72 NG/ML (ref 8–388)
GFR SERPL CREATININE-BSD FRML MDRD: 8 ML/MIN/1.73SQ M
GLUCOSE SERPL-MCNC: 131 MG/DL (ref 65–140)
GLUCOSE SERPL-MCNC: 150 MG/DL (ref 65–140)
GLUCOSE SERPL-MCNC: 294 MG/DL (ref 65–140)
GLUCOSE SERPL-MCNC: 324 MG/DL (ref 65–140)
HBA1C MFR BLD: 9.3 % (ref 4.2–6.3)
HBV CORE AB SER QL: NORMAL
HBV CORE IGM SER QL: NORMAL
HBV SURFACE AB SER-ACNC: 23.1 MIU/ML
HBV SURFACE AG SER QL: NORMAL
HCT VFR BLD AUTO: 33.2 % (ref 34.8–46.1)
HCV AB SER QL: NORMAL
HGB BLD-MCNC: 10.9 G/DL (ref 11.5–15.4)
HIV 1+2 AB+HIV1 P24 AG SERPL QL IA: NORMAL
HIV1 P24 AG SER QL: NORMAL
IRON SATN MFR SERPL: 27 %
IRON SERPL-MCNC: 62 UG/DL (ref 50–170)
LYMPHOCYTES # BLD AUTO: 3.43 THOUSANDS/ΜL (ref 0.6–4.47)
LYMPHOCYTES NFR BLD AUTO: 32 % (ref 14–44)
MAGNESIUM SERPL-MCNC: 1.7 MG/DL (ref 1.6–2.6)
MCH RBC QN AUTO: 29 PG (ref 26.8–34.3)
MCHC RBC AUTO-ENTMCNC: 32.8 G/DL (ref 31.4–37.4)
MCV RBC AUTO: 88 FL (ref 82–98)
MONOCYTES # BLD AUTO: 0.56 THOUSAND/ΜL (ref 0.17–1.22)
MONOCYTES NFR BLD AUTO: 5 % (ref 4–12)
NEUTROPHILS # BLD AUTO: 6.7 THOUSANDS/ΜL (ref 1.85–7.62)
NEUTS SEG NFR BLD AUTO: 62 % (ref 43–75)
NRBC BLD AUTO-RTO: 0 /100 WBCS
NT-PROBNP SERPL-MCNC: 4428 PG/ML
P AXIS: 61 DEGREES
PHOSPHATE SERPL-MCNC: 4.1 MG/DL (ref 2.7–4.5)
PLATELET # BLD AUTO: 251 THOUSANDS/UL (ref 149–390)
PMV BLD AUTO: 9.6 FL (ref 8.9–12.7)
POTASSIUM SERPL-SCNC: 4 MMOL/L (ref 3.5–5.3)
PR INTERVAL: 168 MS
PTH-INTACT SERPL-MCNC: 606.6 PG/ML (ref 14–72)
QRS AXIS: -14 DEGREES
QRSD INTERVAL: 80 MS
QT INTERVAL: 386 MS
QTC INTERVAL: 445 MS
RBC # BLD AUTO: 3.76 MILLION/UL (ref 3.81–5.12)
SODIUM SERPL-SCNC: 141 MMOL/L (ref 136–145)
T WAVE AXIS: 65 DEGREES
TIBC SERPL-MCNC: 230 UG/DL (ref 250–450)
VENTRICULAR RATE: 80 BPM
WBC # BLD AUTO: 10.89 THOUSAND/UL (ref 4.31–10.16)

## 2017-11-26 PROCEDURE — 83520 IMMUNOASSAY QUANT NOS NONAB: CPT | Performed by: INTERNAL MEDICINE

## 2017-11-26 PROCEDURE — 84165 PROTEIN E-PHORESIS SERUM: CPT | Performed by: INTERNAL MEDICINE

## 2017-11-26 PROCEDURE — 86430 RHEUMATOID FACTOR TEST QUAL: CPT | Performed by: INTERNAL MEDICINE

## 2017-11-26 PROCEDURE — 86225 DNA ANTIBODY NATIVE: CPT | Performed by: INTERNAL MEDICINE

## 2017-11-26 PROCEDURE — 83883 ASSAY NEPHELOMETRY NOT SPEC: CPT | Performed by: INTERNAL MEDICINE

## 2017-11-26 PROCEDURE — 85025 COMPLETE CBC W/AUTO DIFF WBC: CPT | Performed by: NURSE PRACTITIONER

## 2017-11-26 PROCEDURE — 93306 TTE W/DOPPLER COMPLETE: CPT

## 2017-11-26 PROCEDURE — 86038 ANTINUCLEAR ANTIBODIES: CPT | Performed by: INTERNAL MEDICINE

## 2017-11-26 PROCEDURE — 83970 ASSAY OF PARATHORMONE: CPT | Performed by: INTERNAL MEDICINE

## 2017-11-26 PROCEDURE — 87806 HIV AG W/HIV1&2 ANTB W/OPTIC: CPT | Performed by: INTERNAL MEDICINE

## 2017-11-26 PROCEDURE — 83540 ASSAY OF IRON: CPT | Performed by: INTERNAL MEDICINE

## 2017-11-26 PROCEDURE — 82948 REAGENT STRIP/BLOOD GLUCOSE: CPT

## 2017-11-26 PROCEDURE — 86160 COMPLEMENT ANTIGEN: CPT | Performed by: INTERNAL MEDICINE

## 2017-11-26 PROCEDURE — 83880 ASSAY OF NATRIURETIC PEPTIDE: CPT | Performed by: INTERNAL MEDICINE

## 2017-11-26 PROCEDURE — G8979 MOBILITY GOAL STATUS: HCPCS

## 2017-11-26 PROCEDURE — 83550 IRON BINDING TEST: CPT | Performed by: INTERNAL MEDICINE

## 2017-11-26 PROCEDURE — G8978 MOBILITY CURRENT STATUS: HCPCS

## 2017-11-26 PROCEDURE — 84100 ASSAY OF PHOSPHORUS: CPT | Performed by: NURSE PRACTITIONER

## 2017-11-26 PROCEDURE — 86255 FLUORESCENT ANTIBODY SCREEN: CPT | Performed by: INTERNAL MEDICINE

## 2017-11-26 PROCEDURE — 82728 ASSAY OF FERRITIN: CPT | Performed by: INTERNAL MEDICINE

## 2017-11-26 PROCEDURE — 83036 HEMOGLOBIN GLYCOSYLATED A1C: CPT | Performed by: NURSE PRACTITIONER

## 2017-11-26 PROCEDURE — 97163 PT EVAL HIGH COMPLEX 45 MIN: CPT

## 2017-11-26 PROCEDURE — 83735 ASSAY OF MAGNESIUM: CPT | Performed by: NURSE PRACTITIONER

## 2017-11-26 PROCEDURE — 80048 BASIC METABOLIC PNL TOTAL CA: CPT | Performed by: NURSE PRACTITIONER

## 2017-11-26 PROCEDURE — 82040 ASSAY OF SERUM ALBUMIN: CPT | Performed by: NURSE PRACTITIONER

## 2017-11-26 PROCEDURE — 84166 PROTEIN E-PHORESIS/URINE/CSF: CPT | Performed by: INTERNAL MEDICINE

## 2017-11-26 PROCEDURE — 82595 ASSAY OF CRYOGLOBULIN: CPT | Performed by: INTERNAL MEDICINE

## 2017-11-26 RX ORDER — HYDRALAZINE HYDROCHLORIDE 20 MG/ML
10 INJECTION INTRAMUSCULAR; INTRAVENOUS ONCE
Status: COMPLETED | OUTPATIENT
Start: 2017-11-26 | End: 2017-11-26

## 2017-11-26 RX ORDER — HYDRALAZINE HYDROCHLORIDE 20 MG/ML
20 INJECTION INTRAMUSCULAR; INTRAVENOUS ONCE
Status: COMPLETED | OUTPATIENT
Start: 2017-11-26 | End: 2017-11-26

## 2017-11-26 RX ORDER — CARVEDILOL 12.5 MG/1
12.5 TABLET ORAL 2 TIMES DAILY WITH MEALS
Status: DISCONTINUED | OUTPATIENT
Start: 2017-11-26 | End: 2017-11-29 | Stop reason: HOSPADM

## 2017-11-26 RX ORDER — AMLODIPINE BESYLATE 10 MG/1
10 TABLET ORAL DAILY
Status: DISCONTINUED | OUTPATIENT
Start: 2017-11-27 | End: 2017-11-29 | Stop reason: HOSPADM

## 2017-11-26 RX ADMIN — CIPROFLOXACIN HYDROCHLORIDE 250 MG: 250 TABLET, FILM COATED ORAL at 11:47

## 2017-11-26 RX ADMIN — CARVEDILOL 6.25 MG: 6.25 TABLET, FILM COATED ORAL at 06:48

## 2017-11-26 RX ADMIN — HYDRALAZINE HYDROCHLORIDE 20 MG: 20 INJECTION INTRAMUSCULAR; INTRAVENOUS at 14:58

## 2017-11-26 RX ADMIN — HYDRALAZINE HYDROCHLORIDE 10 MG: 20 INJECTION INTRAMUSCULAR; INTRAVENOUS at 13:23

## 2017-11-26 RX ADMIN — HEPARIN SODIUM 7500 UNITS: 5000 INJECTION, SOLUTION INTRAVENOUS; SUBCUTANEOUS at 20:58

## 2017-11-26 RX ADMIN — CHLORHEXIDINE GLUCONATE 15 ML: 1.2 RINSE ORAL at 10:16

## 2017-11-26 RX ADMIN — CARVEDILOL 12.5 MG: 12.5 TABLET, FILM COATED ORAL at 17:34

## 2017-11-26 RX ADMIN — INSULIN LISPRO 4 UNITS: 100 INJECTION, SOLUTION INTRAVENOUS; SUBCUTANEOUS at 20:54

## 2017-11-26 RX ADMIN — INSULIN LISPRO 4 UNITS: 100 INJECTION, SOLUTION INTRAVENOUS; SUBCUTANEOUS at 11:49

## 2017-11-26 RX ADMIN — HEPARIN SODIUM 7500 UNITS: 5000 INJECTION, SOLUTION INTRAVENOUS; SUBCUTANEOUS at 06:48

## 2017-11-26 RX ADMIN — HYDRALAZINE HYDROCHLORIDE 10 MG: 20 INJECTION INTRAMUSCULAR; INTRAVENOUS at 04:50

## 2017-11-26 RX ADMIN — CHLORHEXIDINE GLUCONATE 15 ML: 1.2 RINSE ORAL at 20:50

## 2017-11-26 RX ADMIN — INSULIN DETEMIR 10 UNITS: 100 INJECTION, SOLUTION SUBCUTANEOUS at 20:57

## 2017-11-26 RX ADMIN — HEPARIN SODIUM 7500 UNITS: 5000 INJECTION, SOLUTION INTRAVENOUS; SUBCUTANEOUS at 13:22

## 2017-11-26 RX ADMIN — ATORVASTATIN CALCIUM 80 MG: 40 TABLET, FILM COATED ORAL at 17:34

## 2017-11-26 RX ADMIN — AMLODIPINE BESYLATE 5 MG: 5 TABLET ORAL at 10:16

## 2017-11-26 NOTE — CONSULTS
Consultation - Neurology   Angélica John 59 y o  female MRN: [id number]  Unit/Bed#:  Encounter: [id number]      Physician Requesting Consult: Eliza Andrade MD  Reason for Consult:  Right upper extremity weakness  Hx and PE limited by:  None    HPI: Angélica John is a right handed  59 y o  female who  apparently woke up 2 weeks ago with right hand weakness, and has a history of diabetes, hypertension, hyperlipidemia CKD and was detected to have hyperkalemia with acute on chronic kidney disease  She was admitted to the ICU last night and had a CT scan of the brain done which showed evidence of a subacute left postero parietal temporal CVA in addition to an old left basal ganglia CVA  Patient denies any headaches, blurred vision, diplopia, perioral numbness, claims hand weakness has improved today, but continues to have mild weakness still persistent in the thumb and the index finger and denies any tingling or numbness  She also denies any neck pain, and denies any history of CVA in the past   Patient denies any speech or gait difficulty  Review of Systems:  See history of present illness for pertinent neurological symptoms  All other systems are negative  Historical Information   Past Medical History:   Diagnosis Date    Atrial fibrillation ([facility] )     CKD (chronic kidney disease)     Diabetes mellitus ([facility] )     type II    Hyperlipidemia     Hypertension     Stroke (cerebrum) ([facility] ) 11/25/2017     History reviewed  No pertinent surgical history    Social History   History   Smoking Status    Former Smoker   Smokeless Tobacco    Never Used     History   Alcohol Use No     History   Drug Use No       Family History:   Family History   Problem Relation Age of Onset    Kidney failure Sister        No Known Allergies    Meds:  All current active meds have been reviewed    Scheduled Meds:  amLODIPine 5 mg Oral Daily   atorvastatin 80 mg Oral Daily With Dinner   carvedilol 6 25 mg Oral BID With Meals chlorhexidine 15 mL Swish & Spit Q12H [facility]   ciprofloxacin 250 mg Oral Q18H   heparin (porcine) 7,500 Units Subcutaneous Q8H [facility]   insulin detemir 10 Units Subcutaneous HS   insulin lispro 1-5 Units Subcutaneous HS   insulin lispro 1-6 Units Subcutaneous TID AC     PRN Meds:  influenza vaccine    pneumococcal 13-valent conjugate vaccine    Physical Exam:   Objective   Vitals:   Vitals:    11/26/17 1016   BP: (!) 175/75   Pulse:    Resp:    Temp:    SpO2:    ,Body mass index is 37 23 kg/m²  Patient was examined in critical care unit bed  General appearance: Cooperative in no acute distress  Head & neck head is atraumatic and normocephalic  Neck is supple with full range of motion  Cardiovascular: Carotid arteriesno carotid bruits  Neurologic:   Patient is alert awake oriented, high functions are intact, speech is fluent  Patient has difficulty with right left confusion and obeying 3 step commands, no difficulty with repetition noted  Cranial nerve examination reveals visual fields reveals evidence of a right homonymous hemianopsia, pupils equal and reactive, extraocular movements intact, fundi showed sharp disc margins, sensation in the V1 V2 V3 distribution is symmetric, no obvious facial asymmetry noted, tongue is midline and gag is adequate  Motor examination reveals normal tone and bulk, patient has evidence of a right upper extremity pronator drift  , strength is 4+/5 distally in the right wrist flexors, as well as proximally 5-/5 in the right deltoid, and the right triceps  All other muscle groups on the left side as well as the right lower extremity are 5/5 , deep tendon reflexes are intact, toes are downgoing  Sensory examination to pinprick light touch proprioception and vibration is preserved bilaterally, patient does  extinguish to double simultaneous stimuli on the right side  Coordination no evidence of any finger-to-nose dysmetria  Gait could not be evaluated      Lab Results:Lab Results:   I have personally reviewed pertinent reports  , CBC:   Results from last 7 days  Lab Units 11/26/17  0542 11/25/17  1900 11/25/17  1218   WBC Thousand/uL 10 89*  --  9 92   RBC Million/uL 3 76*  --  3 74*   HEMOGLOBIN g/dL 10 9*  --  10 8*   HEMATOCRIT % 33 2*  --  33 7*   MCV fL 88  --  90   PLATELETS Thousands/uL 251 227 259   , BMP/CMP:   Results from last 7 days  Lab Units 11/26/17  0542 11/25/17  1245   SODIUM mmol/L 141 142   POTASSIUM mmol/L 4 0 6 4*   CHLORIDE mmol/L 108 107   CO2 mmol/L 23 23   ANION GAP mmol/L 10 12   BUN mg/dL 50* 58*   CREATININE mg/dL 6 22* 6 35*   GLUCOSE RANDOM mg/dL 150* 245*   CALCIUM mg/dL 9 4 9 4   AST U/L  --  29   ALT U/L  --  17   ALK PHOS U/L  --  172*   TOTAL PROTEIN g/dL  --  8 0   ALBUMIN g/dL 3 1* 3 5   BILIRUBIN TOTAL mg/dL  --  0 40   EGFR ml/min/1 73sq m 8 8     I have personally reviewed pertinent reports  EEG, Echo, Pathology, and Other Studies: I have personally reviewed pertinent films in PACS    Family, was present at the bedside for history and examination  Assessment:  1  Right upper extremity weakness with other subtle neurological deficits most likely secondary to subacute left MCA posterior division CVA  2  Hypertension  3  Diabetes mellitus  4  Acute on chronic kidney disease  Plan:  Patient to undergo an MRI of the brain, carotid ultrasound, 2D echo, will recommend to put on stroke pathway, continue aspirin 81 mg daily, tight blood pressure and blood sugar control, rehab when medically stable  Will follow up this patient with you      Sherman Melvin MD  11/26/2017,11:14 AM    "This note has been constructed using a voice recognition system "

## 2017-11-26 NOTE — PLAN OF CARE
DISCHARGE PLANNING     Discharge to home or other facility with appropriate resources Progressing        INFECTION - ADULT     Absence or prevention of progression during hospitalization Progressing        Knowledge Deficit     Patient/family/caregiver demonstrates understanding of disease process, treatment plan, medications, and discharge instructions Progressing        METABOLIC, FLUID AND ELECTROLYTES - ADULT     Electrolytes maintained within normal limits Progressing     Fluid balance maintained Progressing     Glucose maintained within target range Progressing        NEUROSENSORY - ADULT     Achieves stable or improved neurological status Progressing     Achieves maximal functionality and self care Progressing        PAIN - ADULT     Verbalizes/displays adequate comfort level or baseline comfort level Progressing        Prexisting or High Potential for Compromised Skin Integrity     Skin integrity is maintained or improved Progressing        SAFETY ADULT     Patient will remain free of falls Progressing     Maintain or return to baseline ADL function Progressing     Maintain or return mobility status to optimal level Progressing

## 2017-11-26 NOTE — PROGRESS NOTES
NEPHROLOGY PROGRESS NOTE    Patient: Adele Other               Sex: female          DOA: 11/25/2017 11:24 AM   YOB: 1960        Age:  59 y o         LOS:  LOS: 1 day     REASON FOR THE CONSULTATION:  Further management of worsening renal function    HPI     This is a 49-year-old female initially admitted for right upper extremities weakness will also found to have hyperkalemia and worsening renal function on admission and Nephrology were consulted for further management  SUBJECTIVE     - patient was feeling better this morning and also feels that her right extremities weakness is also improving  This morning patient is refusing for dialysis  Updated patient's  at bedside today also  Patient denies nausea / vomiting / headache / dizziness / SOB / chest pain today    - Reviewed last 24 hrs events     CURRENT MEDICATIONS       Current Facility-Administered Medications:     amLODIPine (NORVASC) tablet 5 mg, 5 mg, Oral, Daily, KATIE Zamora, 5 mg at 11/26/17 1016    atorvastatin (LIPITOR) tablet 80 mg, 80 mg, Oral, Daily With Yvon KATIE Enriquez, 80 mg at 11/25/17 1807    carvedilol (COREG) tablet 6 25 mg, 6 25 mg, Oral, BID With Meals, KATIE Zamora, 6 25 mg at 11/26/17 0648    chlorhexidine (PERIDEX) 0 12 % oral rinse 15 mL, 15 mL, Swish & Spit, Q12H [address], KATIE Zamora, 15 mL at 11/26/17 1016    ciprofloxacin (CIPRO) tablet 250 mg, 250 mg, Oral, Q18H, KATIE Zamora, 250 mg at 11/26/17 1147    heparin (porcine) subcutaneous injection 7,500 Units, 7,500 Units, Subcutaneous, Q8H [address], 7,500 Units at 11/26/17 0648 **AND** Platelet count, , , Once, KATIE Zamora    influenza inactivated quadrivalent vaccine (FLULAVAL) IM injection 0 5 mL, 0 5 mL, Intramuscular, Prior to discharge, Willow Erwin MD    insulin detemir (LEVEMIR) subcutaneous injection 10 Units, 10 Units, Subcutaneous, HS, KATIE Juarez, 10 Units at 11/25/17 2227    insulin lispro (HumaLOG) 100 units/mL subcutaneous injection 1-5 Units, 1-5 Units, Subcutaneous, HS, KATIE Juarez, 1 Units at 11/25/17 2227    insulin lispro (HumaLOG) 100 units/mL subcutaneous injection 1-6 Units, 1-6 Units, Subcutaneous, TID AC, 4 Units at 11/26/17 1149 **AND** Fingerstick Glucose (POCT), , , TID AC, KATIE Juarez    pneumococcal 13-valent conjugate vaccine (PREVNAR-13) IM injection 0 5 mL, 0 5 mL, Intramuscular, Prior to discharge, Sydney Wilkerson MD    OBJECTIVE     Current Weight: Weight - Scale: 124 kg (274 lb 7 6 oz)  Vitals:    11/26/17 1138   BP:    Pulse: 76   Resp: 18   Temp: 99 °F (37 2 °C)   SpO2: 97%       Intake/Output Summary (Last 24 hours) at 11/26/17 1221  Last data filed at 11/26/17 0401   Gross per 24 hour   Intake                0 ml   Output             1900 ml   Net            -1900 ml       PHYSICAL EXAMINATION     GEN:  Awake and not in acute distress  RS:  Bilateral equal air entry, no wheezing  CVS:  S1-S2 heard  Abdomen:  Soft, nontender, positive bowel sounds  Extremities:  No edema, skin is warm on touch  CNS:  Awake and follows commands  HEENT:  Pink conjunctiva, no JVD, head is atraumatic  Psychiatric:  Not suicidal  Musculoskeletal:  No gross bony deformity seen in hands  Lymph Node:  No palpable cervical lymphadenopathy    LAB RESULTS       Results from last 7 days  Lab Units 11/26/17  0542 11/25/17  1900 11/25/17  1245 11/25/17  1218   WBC Thousand/uL 10 89*  --   --  9 92   HEMOGLOBIN g/dL 10 9*  --   --  10 8*   HEMATOCRIT % 33 2*  --   --  33 7*   PLATELETS Thousands/uL 251 227  --  259   SODIUM mmol/L 141  --  142  --    POTASSIUM mmol/L 4 0  --  6 4*  --    CHLORIDE mmol/L 108  --  107  --    CO2 mmol/L 23  --  23  --    BUN mg/dL 50*  --  58*  --    CREATININE mg/dL 6 22*  --  6 35*  --    EGFR ml/min/1 73sq m 8  --  8  --    CALCIUM mg/dL 9 4  --  9 4  --    MAGNESIUM mg/dL 1 7  --   --   --    PHOSPHORUS mg/dL 4 1  --   --   --    ALBUMIN g/dL 3 1*  --  3 5  --    TOTAL PROTEIN g/dL  --   --  8 0  --    GLUCOSE RANDOM mg/dL 150*  --  245*  --        I have personally reviewed the old medical records and patient's previously known baseline creatinine level is unknown    RADIOLOGY RESULTS      Results for orders placed during the hospital encounter of 11/25/17   XR chest 2 views    Narrative CHEST     INDICATION:  Confusion  Right hand weakness    COMPARISON:  None    VIEWS:  Frontal and lateral projections    IMAGES:  2    FINDINGS:         Cardiomediastinal silhouette appears unremarkable  Right diaphragm is elevated  Lungs are clear  No evidence of consolidation pulmonary edema pleural effusion    Visualized osseous structures appear within normal limits for the patient's age  Impression No active pulmonary disease  Workstation performed: [id number]         PLAN / RECOMMENDATIONS      1  [place]  Multifactorial and suspect progressive underlying CKD stage 5  Upon review of old medical records patient's baseline creatinine level is unknown but on 7/14/17 patient was also found to have creatinine of 5 58  Yesterday patient had agree to proceed to dialysis but earlier this morning patient is now refusing to proceed to dialysis  Patient's potassium level has improved with the medical management with current potassium of 4 0  Patient is not experiencing any uremic symptoms today  Patient was found to have nephrotic range proteinuria with microscopic hematuria and underlying glomerulonephritis cannot be ruled out  Discussed in length with patient and her  today regarding further evaluation of nephrotic range proteinuria  I will plan to check complements, ALDEN, anti double-stranded DNA, rheumatoid factor, cryoglobulin, anti GBM antibody, Anca, SPEP, UPEP, immunofixation and HIV panel today for further evaluation  Patient would be benefitted from renal biopsy that will also give was idea about chronicity although patient was admitted with right-sided weakness and currently on aspirin and also been followed by neurologist   Will consider proceeding to renal biopsy if patient can come off anticoagulation including aspirin for at least 1-2 weeks  Discussed in length with patient and her  today that current amount of proteinuria is a high risk factor for further worsening of renal function  If we and a proceeding to renal biopsy and if patient is found to have severe tubular sclerosis then she would end up needing dialysis for long time but if she found to have reversible changes then she may not need dialysis for long time although it would be difficult decision to make at this point without renal biopsy  As mentioned earlier as patient is on blood thinner doing renal biopsy is also a very risky as she would be at higher risk for internal bleeding  Plan to recheck renal function again tomorrow and will reach out to the patient again if found to have worsening renal function or developed any symptomatic uremia for initiation of dialysis if patient is agreeable  Overnight renal function has remained relatively stable at creatinine of 6 22     2  Proteinuria  Nephrotic range, since patient also have some microscopic hematuria underlying glomerulonephritis cannot be ruled out  We will plan too rder serological workup as mentioned above and may consider renal biopsy in future if able to stop blood thinners for 1-2 weeks  Will not consider use of ACE-I or ARB in the setting of recent hyperkalemia  3   Hyperkalemia  Improved with medical management with current potassium of 4 4  Plan was also d/w the ICU team    Nahed Rhodes MD  Nephrology  11/26/2017        Portions of the record may have been created with voice recognition software   Occasional wrong word or "sound a like" substitutions may have occurred due to the inherent limitations of voice recognition software  Read the chart carefully and recognize, using context, where substitutions have occurred

## 2017-11-26 NOTE — PLAN OF CARE
Problem: PHYSICAL THERAPY ADULT  Goal: Performs mobility at highest level of function for planned discharge setting  See evaluation for individualized goals  Treatment/Interventions: Functional transfer training, Elevations, Therapeutic exercise, Endurance training, Cognitive reorientation, Patient/family training, Equipment eval/education, Bed mobility, Gait training, Continued evaluation, Spoke to nursing  Equipment Recommended:  (TBD)       See flowsheet documentation for full assessment, interventions and recommendations  Prognosis: Good  Problem List: Impaired balance, Decreased mobility, Decreased cognition  Assessment: Pt is 59 y o  female seen for PT evaluation on 11/26/2017 s/p [facility] w/ Acute on chronic kidney failure ([facility] ); pt presented to ED w/ 1 wk h/o R UE weakness  Pt also had confusion, forgetfulness, and difficulty w/ certain words  CT of brain revealed subacute L parietal stroke in evolution  PT consulted to assess pt's functional mobility and d/c needs  Order placed for PT eval and tx  Comorbidities affecting pt's physical performance at time of assessment include: DM type 2, hyperlipidemia, HTN, CKD, AFib  PTA, pt was independent w/ all functional mobility w/ no AD or DME, ambulates community distances and elevations, has FF of LAMB[place], lives w/ spouse in 2 level home and retired  Pt's  reporting they reside on basement level (full bath and bedroom) of home  Personal factors affecting pt at time of IE include: lives in 2 story house, stairs to enter home, inability to ambulate household distances, inability to navigate community distances, decreased cognition, decreased initiation and engagement and limited insight into impairments   Please find objective findings from PT assessment regarding body systems outlined above with impairments and limitations including impaired balance, decreased endurance, decreased activity tolerance, decreased functional mobility tolerance, decreased safety awareness, fall risk and decreased cognition, as well as mobility assessment (need for supervision level of assist w/ all phases of mobility when usually ambulating independently)  The following objective measures performed on IE also reveal limitations: Barthel Index: 55/100 and Modified [place]: 3 (moderate disability)  Pt's clinical presentation is currently unstable/unpredictable seen in pt's presentation of elevated BP, need for supervision level of assist w/ all phases of mobility when usually mobilizing independently and on telemetry monitoring  Pt to benefit from continued PT tx to address deficits as defined above and maximize level of functional independent mobility and consistency  From PT/mobility standpoint, recommendation at time of d/c would be Home PT vs  anticipate no needs pending progress in order to facilitate return to PLOF  Barriers to Discharge: Inaccessible home environment  Barriers to Discharge Comments: PT unable to assess level surface amb x household distances or elevations at this time  Recommendation: Home PT, Home with family support (HHPT vs  no needs, pending further mobility assessment)     PT - OK to Discharge: No    See flowsheet documentation for full assessment

## 2017-11-26 NOTE — PLAN OF CARE
DISCHARGE PLANNING     Discharge to home or other facility with appropriate resources Progressing        INFECTION - ADULT     Absence or prevention of progression during hospitalization Progressing        Knowledge Deficit     Patient/family/caregiver demonstrates understanding of disease process, treatment plan, medications, and discharge instructions Progressing        METABOLIC, FLUID AND ELECTROLYTES - ADULT     Electrolytes maintained within normal limits Progressing     Fluid balance maintained Progressing     Glucose maintained within target range Progressing        NEUROSENSORY - ADULT     Achieves stable or improved neurological status Progressing     Achieves maximal functionality and self care Progressing        PAIN - ADULT     Verbalizes/displays adequate comfort level or baseline comfort level Progressing        SAFETY ADULT     Patient will remain free of falls Progressing     Maintain or return to baseline ADL function Progressing     Maintain or return mobility status to optimal level Progressing

## 2017-11-26 NOTE — SOCIAL WORK
Conversation w nephrology and pt is currently refusing dialysis   Will await notice from nephrology if cm needs to initiate outpatient dialysis

## 2017-11-26 NOTE — PROGRESS NOTES
Transfer Note - ICU Transfer to [place]/MS drew Goldberg 59 y o  female MRN: [id number]  [facility]   Unit/Bed#:  Encounter: [id number]    Code Status: Level 1 - Full Code  POA:    POLST:      Reason for ICU adm:  Hyperkalemia, acute on chronic kidney failure, subacute stroke    Active problems:   Principal Problem:    Acute on chronic kidney failure ([facility] )  Active Problems:    Hyperkalemia    Essential hypertension    HLD (hyperlipidemia)    Type 1 diabetes mellitus ([facility] )    UTI (urinary tract infection)    Stroke (cerebrum) ([facility] )  Resolved Problems:    * No resolved hospital problems  *      Consultants:  Nephrology, neurology    History of Present Illness:  78-year-old female who presented to the emergency room with a one-week history of right upper extremity weakness  Summary of clinical course:  ER evaluation revealed subacute evolving left parietal CVA  She was also found to have an elevated creatinine and potassium consistent with her CKD  Initial conversation with the nephrologist revealed that the patient was not interested in dialysis, as was her discussion in July  Patient was persuaded by the emergency room doctor to consider said dialysis  Upon our evaluation patient again was unsure of her position on whether not she wish to have dialysis  Extensive discussion held offering a trial of dialysis to determine ability to recover, with the possibility of long-term dialysis being a liter consideration  Patient is not sure that she wants to entertain dialysis at all  Nephrology in speaking to patient again this morning  Recent or scheduled procedures:  2D echo and carotid ultrasounds pending    Outstanding/pending diagnostics:  2D echo and carotid ultrasounds pending    Cultures:  Urine culture pending       Mobilization Plan:  Out of bed as tolerated    Nutrition Plan:   Tolerating p o  diet    Discharge Plan:   Patient should be ready for discharge to home versus short-term rehab when medically stable    Initial Physical Therapy Recommendations:  Pending  Initial Occupational Therapy Recommendations:  Pending  Initial /Plan:  Pending     Specific Diagnosis Plan:    Sepsis: Source:  UTI present on admission, Antibiotics:  Cipro, Length:  5 days    Need for Infectious Disease consult:  No    Spoke with Dr Gina Stockton  regarding transfer  Please call critical Care Medicine with any questions or concerns  Portions of the record may have been created with voice recognition software  Occasional wrong word or "sound a like" substitutions may have occurred due to the inherent limitations of voice recognition software  Read the chart carefully and recognize, using context, where substitutions have occurred      KATIE Fernandes

## 2017-11-26 NOTE — PROGRESS NOTES
Progress Note - Critical Care   Sofía Soares 59 y o  female MRN: [id number]  Unit/Bed#:  Encounter: [id number]    Attending Physician: Glendy Dukes MD      ______________________________________________________________________  Assessment and Plan:   Principal Problem:    Acute on chronic kidney failure [facility])  Active Problems:    Hyperkalemia    Essential hypertension    HLD (hyperlipidemia)    Type 1 [facility] mellitus ([facility] )    UTI (urinary tract infection)    Stroke (cerebrum) ([facility] )  Resolved Problems:    * No resolved hospital problems  *        Neuro:  Continue to monitor serial exams  Neurology to see today  CV:  Blood pressure with an established parameters for accelerated hypertension  Goal systolic between 482 and 683  Pulm:  Encourage incentive spirometry to prevent atelectasis  Mobilize out of bed as able  GI:  No acute issues identified  :  Nephrology following for acute kidney injury on CKD  Patient deciding on dialysis  F/E/N:  Gentle hydration  ID:  Cipro for UTI  Heme:  Hemoglobin and platelet stable    Endo: Insulin protocol for glycemic control  Msk/Skin:  No issues identified  Disposition:  Maintain step-down level of care  Code Status: Level 1 - Full Code    Counseling / Coordination of Care  Total Critical Care time spent 30 minutes excluding procedures, teaching and family updates  ______________________________________________________________________    Chief Complaint:  Right upper extremity weakness    24 Hour Events:  No acute events reported by nursing overnight  Patient deciding about dialysis        ______________________________________________________________________    Physical Exam:     GEN:  Well nourished, well developed, appears stated age, no acute distress  HEENT:  Sclera anicteric, mucous membranes pink and moist, conjunctiva pink, no ba/rhinorrhea  Neck:  No lymphadenopathy, normal ROM, supple, no bruits  CV :  S1S2, no murmurs, rubs or gallops  Intact distal pulses  No JVD  Resp:  Lungs CTA =B/L  No subq air or crepitus  Symmetrical expansion  No cough noted  GI :  Abd soft, nontender, no guarding/rebound, nondistended, normoactive bowel sounds X4 quads, no organomegaly appreciated  Neuro:  CN II-XII grossly intact, nonfocal exam, speech clear, GCS 15      ______________________________________________________________________  Vitals:    17 0252 17 0400 17 0512 17 0530   BP: (!) 190/73 (!) 199/76  (!) 181/78   Pulse: 56 66  80   Resp: 14 17  (!) 24   Temp: 97 7 °F (36 5 °C)      TempSrc: Oral      SpO2: 99% 100%  97%   Weight:   124 kg (274 lb 7 6 oz)    Height:           Temperature:   Temp (24hrs), Av 1 °F (36 7 °C), Min:97 5 °F (36 4 °C), Max:99 6 °F (37 6 °C)    Current Temperature: 97 7 °F (36 5 °C)  Weights:   IBW: 73 1 kg    Body mass index is 37 23 kg/m²  Weight (last 2 days)     Date/Time   Weight    17 0512  124 (274 47)    17 1730  125 (274 69)    17 1128  127 (279 54)            Hemodynamic Monitoring:  N/A     Non-Invasive/Invasive Ventilation Settings:  Respiratory    Lab Data (Last 4 hours)    None         O2/Vent Data (Last 4 hours)    None              No results found for: PHART, KBN4IKN, PO2ART, LOB5CNV, L1YODIKG, BEART, SOURCE  SpO2: SpO2: 97 %  Intake and Outputs:  I/O        0701 -  0700  0701 -  0700    Urine (mL/kg/hr)  1900    Total Output   1900    Net   -1900                Nutrition:        Diet Orders            Start     Ordered    17 1611  Diet Clear Liquid  Diet effective now     Question Answer Comment   Diet Type Clear Liquid    RD to adjust diet per protocol?  No        17 1613          Labs:     Results from last 7 days  Lab Units 17  0542 17  1900 17  1218   WBC Thousand/uL 10 89*  --  9 92   HEMOGLOBIN g/dL 10 9*  --  10 8*   HEMATOCRIT % 33 2*  --  33 7*   PLATELETS Thousands/uL 251 227 259 NEUTROS PCT % 62  --  77*   MONOS PCT % 5  --  4       Results from last 7 days  Lab Units 11/26/17  0542 11/25/17  1245   SODIUM mmol/L 141 142   POTASSIUM mmol/L 4 0 6 4*   CHLORIDE mmol/L 108 107   CO2 mmol/L 23 23   BUN mg/dL 50* 58*   CREATININE mg/dL 6 22* 6 35*   CALCIUM mg/dL 9 4 9 4   TOTAL PROTEIN g/dL  --  8 0   BILIRUBIN TOTAL mg/dL  --  0 40   ALK PHOS U/L  --  172*   ALT U/L  --  17   AST U/L  --  29   GLUCOSE RANDOM mg/dL 150* 245*       Results from last 7 days  Lab Units 11/26/17  0542   MAGNESIUM mg/dL 1 7     Lab Results   Component Value Date    PHOS 4 1 11/26/2017        Results from last 7 days  Lab Units 11/25/17  1218   INR  1 00   PTT seconds 28       0  Lab Value Date/Time   TROPONINI 0 02 11/25/2017 1218         ABG:No results found for: PHART, JUR5WHF, PO2ART, WFH0CZP, N5QRWJOO, BEART, SOURCE  Imaging:  Carotid and lower extremity duplex is pending for today  EKG:  Sinus rhythm with PACs on the cardiac monitor  Micro:  No results found for: Mian Byrd, WOUNDCULT, SPUTUMCULTUR  Allergies: No Known Allergies  Medications:   Scheduled Meds:  amLODIPine 5 mg Oral Daily   atorvastatin 80 mg Oral Daily With Dinner   carvedilol 6 25 mg Oral BID With Meals   chlorhexidine 15 mL Swish & Spit Q12H [address]   ciprofloxacin 250 mg Oral Q18H   heparin (porcine) 7,500 Units Subcutaneous Q8H [address]   insulin detemir 10 Units Subcutaneous HS   insulin lispro 1-5 Units Subcutaneous HS   insulin lispro 1-6 Units Subcutaneous TID AC     Continuous Infusions:   PRN Meds:    influenza vaccine 0 5 mL Prior to discharge   pneumococcal 13-valent conjugate vaccine 0 5 mL Prior to discharge     VTE Pharmacologic Prophylaxis: Heparin  VTE Mechanical Prophylaxis: sequential compression device  Invasive lines and devices:   Invasive Devices     Peripheral Intravenous Line            Peripheral IV 11/25/17 Left Antecubital less than 1 day                     Portions of the record may have been created with voice recognition software  Occasional wrong word or "sound a like" substitutions may have occurred due to the inherent limitations of voice recognition software  Read the chart carefully and recognize, using context, where substitutions have occurred      KATIE Wyman

## 2017-11-26 NOTE — PHYSICAL THERAPY NOTE
Physical Therapy Evaluation    Patient's Name: Pamela Lavallette    Admitting Diagnosis  Acute hyperkalemia [E87 5]  Acute kidney failure ([facility] ) [N17 9]  Altered mental status [R41 82]  CVA [facility]cerebral vascular accident) [facility] ) [I63 9]  D[place]AS (acute kidney injury) ([facility] ) [N17 9]    Problem List  Patient Active Problem List   Diagnosis    Acute on chronic kidney failure ([facility] )    Hyperkalemia    Essential hypertension    HLD (hyperlipidemia)    Type 1 diabetes mellitus ([facility] )    UTI (urinary tract infection)    [facility]um[facility]    Proteinuria       Past Medical History  Past Medical History:   Diagnosis Date    Atrial fibrillation ([place] [address] )     CKD (chronic kidney disease)     Diabetes mellitus ([place] [address] )     type II    Hyperlipidemia     Hypertension     Stroke [facility]cerebrum) [facility] ) 11/25/2017       Past Surgical History  History reviewed  No pertinent surgical history  11/26/17 1328   Note Type   Note type Eval/Treat   Pain Assessment   Pain Assessment No/denies pain   Pain Score No Pain   Home Living   Type of Home House   Home Layout Two level;Performs ADLs on one level; Able to live on main level with bedroom/bathroom;Stairs to enter with rails  (FF down to basement level where pt and  reside)   Bathroom Shower/Tub Tub/shower unit   Bathroom Toilet Standard   Bathroom Equipment Grab bars in [place] [id number] (no AD at baseline)   Additional Comments social history and PLOF info obtained from patient and pt's  at bedside   Prior Function   Level of [place] Independent with ADLs and functional mobility   Lives With [place] [address] in the last 6 months 0   Vocational Retired   Restrictions/Precautions   [facility] Precautions Per Order No   Other Precautions Telemetry; Fall Risk;Cognitive   General   Additional Pertinent History CHEST X-RAY 11/25/17: No active pulmonary disease; CT HEAD 11/25/17: Apparent evolving nonhemorrhagic left posterior parietal infarct  Old left basal ganglia infarct with adjacent white matter changes of uncertain chronicity  Family/Caregiver Present Yes   Cognition   Overall Cognitive Status Impaired   Arousal/Participation Alert   Orientation Level Oriented to person;Oriented to place; Disoriented to time;Oriented to situation  (pt unable to recall name of hospital)   Memory Decreased recall of biographical information;Decreased recall of recent events;Decreased recall of precautions   Following Commands Follows multistep commands with increased time or repetition   Comments pt unable to recall current president's name   RUE Assessment   RUE Assessment WFL   RUE Strength   RUE Overall Strength Within Functional Limits - able to perform ADL tasks with strength  ( strength slightly reduced on R)   LUE Assessment   LUE Assessment WFL   LUE Strength   LUE Overall Strength Within Functional Limits - able to perform ADL tasks with strength   RLE Assessment   RLE Assessment WFL   Strength RLE   R Hip Flexion 5/5   R Knee Extension 5/5   LLE Assessment   LLE Assessment WFL   Strength LLE   L Hip Flexion 5/5   L Knee Extension 5/5   Coordination   Movements are Fluid and Coordinated 1  (no ataxia observed)   Sensation WFL  ((-) numbness/tingling)   Light Touch   RLE Light Touch Grossly intact   LLE Light Touch Grossly intact   Bed Mobility   Rolling L 5  Supervision   Additional items Assist x 1;HOB elevated; Increased time required   Supine to Sit 5  Supervision   Additional items Assist x 1;Verbal cues;HOB elevated   Sit to Supine 5  Supervision   Additional items Assist x 1;Verbal cues   Additional Comments pt denies any lightheadedness/dizziness in static sitting at EOB; prior to OOB mobility, pt's BP noted to be 208/98, HR remained at 80 bpm  PT opted for sitting and just STS assessment at this time 2/2 elevated BP   RN Is aware of elevated BP and just medicated pt prior to PT assessment which included BP med   Transfers   Sit to Stand 5  Supervision   Additional items Assist x 1;Verbal cues   Stand to Sit 5  Supervision   Additional items Assist x 1; Increased time required;Verbal cues   Ambulation/Elevation   Gait pattern WNL; Short stride   Gait Assistance 5  Supervision   Additional items Assist x 1;Verbal cues   Assistive Device None   Distance 3' side-stepping toward [facility] Management Assistance Not tested  (PT deferred 2/2 elevated BP)   Balance   Static Sitting Good   Dynamic Sitting Good   Static Standing Fair +   Dynamic Standing Fair +   Ambulatory Fair +   Endurance Deficit   Endurance Deficit (to be assessed w/ further mobility assessment)   Activity Tolerance   Activity Tolerance Treatment limited secondary to medical complications (Comment)  (elevated BP)   Nurse Made Aware Yes, HORTENSIA Dash verbalized pt appropriate for PT session, made aware of outcomes/recs   Assessment   Prognosis Good   Problem List Impaired balance;Decreased mobility; Decreased cognition   Assessment Pt is 59 y o  female seen for PT evaluation on 11/26/2017 s/p admit to [facility] on 11/25/2017 w/ Acute on chronic kidney failure ([facility] ); pt presented to ED w/ 1 wk h/o R UE weakness  Pt also had confusion, forgetfulness, and difficulty w/ certain words  CT of brain revealed subacute L parietal stroke in evolution  PT consulted to assess pt's functional mobility and d/c needs  Order placed for PT eval and tx  Comorbidities affecting pt's physical performance at time of assessment include: DM type 2, hyperlipidemia, HTN, CKD, AFib  PTA, pt was independent w/ all functional mobility w/ no AD or DME, ambulates community distances and elevations, has FF of LAMBERT, lives w/ spouse in 2 level home and retired  Pt's  reporting they reside on basement level (full bath and bedroom) of home   Personal factors affecting pt at time of IE include: lives in 2 story house, stairs to enter home, inability to ambulate household distances, inability to navigate community distances, decreased cognition, decreased initiation and engagement and limited insight into impairments  Please find objective findings from PT assessment regarding body systems outlined above with impairments and limitations including impaired balance, decreased endurance, decreased activity tolerance, decreased functional mobility tolerance, decreased safety awareness, fall risk and decreased cognition, as well as mobility assessment (need for supervision level of assist w/ all phases of mobility when usually ambulating independently)  The following objective measures performed on IE also reveal limitations: Barthel Index: 55/100 and Modified [place]: 3 (moderate disability)  Pt's clinical presentation is currently unstable/unpredictable seen in pt's presentation of elevated BP, need for supervision level of assist w/ all phases of mobility when usually mobilizing independently and on telemetry monitoring  Pt to benefit from continued PT tx to address deficits as defined above and maximize level of functional independent mobility and consistency  From PT/mobility standpoint, recommendation at time of d/c would be Home PT vs  anticipate no needs pending progress in order to facilitate return to PLOF     Barriers to Discharge Inaccessible home environment   Barriers to Discharge Comments PT unable to assess level surface amb x household distances or elevations at this time   Goals   Patient Goals "to go home"   [facility] Expiration Date 12/06/17   Short Term Goal #1 In 7-10 days: Perform all bed mobility tasks independently to decrease caregiver burden, Perform all transfers independently to improve independence, Ambulate > 150 ft  with least restrictive assistive device independently w/o LOB and w/ normalized gait pattern 100% of the time, Navigate 10 stairs independently with unilateral handrail to facilitate return to previous living environment, Increase all balance 1/2 grade to decrease risk for falls, Complete exercise program independently and Tolerate 4 hr OOB to faciliate upright tolerance   Treatment Day 0   Plan   Treatment/Interventions Functional transfer training;Elevations; Therapeutic exercise; Endurance training;Cognitive reorientation;Patient/family training;Equipment eval/education; Bed mobility;Gait training;Continued evaluation;Spoke to nursing   PT Frequency 5x/wk   Recommendation   Recommendation Home PT; Home with family support  (HHPT vs  no needs, pending further mobility assessment)   Equipment Recommended (TBD)   PT - OK to Discharge No   Additional Comments pt to demonstrate consistency w/ level surface amb and elevations prior to d/c home   Modified Bruna Scale   Modified [place] Scale 3   Barthel Index   Feeding 10   Bathing 0   Grooming Score 5   Dressing Score 5   Bladder Score 10   Bowels Score 10   Toilet Use Score 5   Transfers (Bed/Chair) Score 10   Mobility (Level Surface) Score 0   Stairs Score 0   Barthel Index Score 55         Edith Lynch, PT

## 2017-11-26 NOTE — CASE MANAGEMENT
Initial Clinical Review    Admission: Date/Time/Statement: 11/25/17 @ 1611     Orders Placed This Encounter   Procedures    Inpatient Admission     Standing Status:   Standing     Number of Occurrences:   1     Order Specific Question:   Admitting Physician     Answer:   Israel Gamble     Order Specific Question:   Level of Care     Answer:   Level 1 Stepdown [13]     Order Specific Question:   Estimated length of stay     Answer:   More than 2 Midnights     Order Specific Question:   Certification     Answer:   I certify that inpatient services are medically necessary for this patient for a duration of greater than two midnights  See H&P and MD Progress Notes for additional information about the patient's course of treatment   Inpatient Admission (expected length of stay for this patient is greater than two midnights)     Standing Status:   Standing     Number of Occurrences:   1     Order Specific Question:   Admitting Physician     Answer:   Israel Gamble     Order Specific Question:   Level of Care     Answer:   Critical Care [15]     Order Specific Question:   Estimated length of stay     Answer:   More than 2 Midnights     Order Specific Question:   Certification     Answer:   I certify that inpatient services are medically necessary for this patient for a duration of greater than two midnights  See H&P and MD Progress Notes for additional information about the patient's course of treatment  ED: Date/Time/Mode of Arrival:   ED Arrival Information     Expected Arrival Acuity Means of Arrival Escorted By Service Admission Type    - 11/25/2017 11:24 [facility] [address] Emergency    Arrival Complaint    AMS          Chief Complaint:   Chief Complaint   Patient presents with    Altered Mental Status     Onset 1 week ago, no neuro hx, confused about medical hx and medications  Accompanied by right hand weakness         History of Illness: Reji Vega is a 59 y o  female with past medical history significant for diabetes on insulin, hyperlipidemia, and hypertension who presents with a one-week history of right upper extremity weakness and no associated symptoms  During her emergency room evaluation, it was discovered that she has acute kidney injury on CKD of unknown severity, and hyperkalemia  She is hemodynamically stable and her EKG does not manifest her hyperkalemia  CT brain revealed a subacute left parietal stroke in evolution  Patient states she last saw her PCP 4 months ago, but is unsure of her name    She is unable to tell me her normal glucose range or medications    ED Vital Signs:   ED Triage Vitals   Temperature Pulse Respirations Blood Pressure SpO2   11/25/17 1132 11/25/17 1128 11/25/17 1128 11/25/17 1136 11/25/17 1128   99 6 °F (37 6 °C) 98 18 112/78 99 %      Temp Source Heart Rate Source Patient Position - Orthostatic VS BP Location FiO2 (%)   11/25/17 1132 11/25/17 1128 11/25/17 1136 11/25/17 1136 --   Oral Monitor Lying Left arm       Pain Score       11/25/17 1128       No Pain        Wt Readings from Last 1 Encounters:   11/26/17 124 kg (274 lb 7 6 oz)       Vital Signs (abnormal):     11/25/17 2300  97 5 °F (36 4 °C)  71  18   202/87  125  99 %  None (Room air)  Lying   11/25/17 2000  --  74   31   204/96  136  --  --  --   11/25/17 1900  97 6 °F (36 4 °C)  72  21   219/93  133  100 %  None (Room air)  Lying   11/25/17 1730  98 2 °F (36 8 °C)  82  20  147/99  --  100 %  None (Room air)  Lying   11/25/17 1625  --  72  18   247/111  --  99 %  None (Room air)  Sitting   11/25/17 1530  --  73  19   197/76  --  99 %  --  --             Abnormal Labs/Diagnostic Test Results:    Lab Units 11/25/17  1218   HEMOGLOBIN g/dL 10 8*   HEMATOCRIT % 33 7*   NEUTROS PCT % 77*      Results from last 7 days  Lab Units 11/25/17  1245   POTASSIUM mmol/L 6 4*   BUN mg/dL 58*   CREATININE  Creatinine     NORMAL RANGE 0 60 - 1 30 mg/dL mg/dL 6 35*     eGFR   8       CT head without contrast   Final Result       Apparent evolving nonhemorrhagic left posterior parietal infarct        Old left basal ganglia infarct with adjacent white matter changes of uncertain chronicity              ED Treatment:   Medication Administration from 11/25/2017 1124 to 11/25/2017 1707       Date/Time Order Dose Route Action     11/25/2017 1246 sodium chloride 0 9 % bolus 1,000 mL 1,000 mL Intravenous New Bag     11/25/2017 1623 sodium polystyrene sulfonate (KAYEXALATE) oral suspension 30 g 30 g Oral Given     11/25/2017 1621 cloNIDine (CATAPRES) tablet 0 1 mg 0 1 mg Oral Given          Past Medical/Surgical History:     Past Medical History:   Diagnosis Date    [facility] ([facility] )     CKD (chronic kidney disease)     [facility] mellitus [facility]     Hyperlipidemia     Hypertension     Stroke (cerebrum) ([facility] ) 11/25/2017       Admitting Diagnosis: Acute hyperkalemia [E87 5]  Acute kidney failure (HCC) [N17 9]  Altered mental status [R41 82]  CVA (cerebral vascular accident) ([facility] ) [I63 9]  DALLAS (acute kidney injury) ([facility] ) [N17 9]    Age/Sex: 59 y o  female    Assessment/Plan:  Patient Active Problem List   Diagnosis    Acute on chronic kidney failure ([facility] )    Hyperkalemia    Essential hypertension    HLD (hyperlipidemia)    Type 1 diabetes mellitus (HCC)    UTI (urinary tract infection)    Stroke (cerebrum) ([facility] )               Plan:                  Neuro: Will place neuro consultation in for the morning  No role for tPA at present given the duration of time between onset and presentation  Serial neuro exams                  CV:  Sinus rhythm with PACs on the monitor  Continue to monitor for arrhythmias and/or AFib  Control hypertension with parental agents once IV access established  Single dose of p o  clonidine given for reduction at present    Prevailing blood pressure 250/150, recommend no more than 25% reduction                    Pulm:  No active issues at present  Encourage incentive spirometry to prevent atelectasis  Mobilize out of bed as able                  GI:  No active issues at present  Tolerating p o                    : UTI present on admission  Will start Cipro p o  for said infection  Will send urine culture  Acute on chronic renal failure being addressed by Nephrology  Extensive discussion with patient and her  in regards to the expectations of hemodialysis  Advised them that this may be a long-term process, however will require further workup to evaluate for same  Patient is unsure that she wants to make the commitment to long-term dialysis  Provided her the option of a short-term dialysis to determine ability to recover, possibility of long-term dialysis, or absence from dialysis entirely  Patient wishes to think about her options overnight, determining quality of life versus prolonging her life  She likes to travel and feels that dialysis may not be compatible with her spontaneous lifestyle                  F/E/N:  Gentle hydration once IV established                  ID:  Cipro for UTI  Monitor cultures                  Heme:  Hemoglobin and platelets stable                  Endo:  AC and HS sugars, insulin protocol  Elevated TSH, awaiting T4 reflex  Will defer treating for hypothyroidism, as this may represent sick euthyroid                  Msk/Skin:  No issues identified                  Disposition:  Admit to step-down    CONSULT NEPHROLOGY  1  DALLAS with hyperkalemia  Multifactorial and suspect underlying worsening CKD stage 5      Upon review of old medical records patient's baseline creatinine level is unknown but on 7/14/17 patient was found to have creatinine of 5 58  Current creatinine is 6 35 with EGFR of 8 with potassium of 6 4  Worsening DALLAS in the setting of hyperkalemia is severe and life-threatening without initiation of dialysis    I have discussed in length with patient and her  regarding importance of initiation of dialysis in the setting of hyperkalemia with worsening renal function  Patient is adamant to go to home without initiation of dialysis and also understands severe and life-threatening complication of worsening renal function with hyperkalemia  I have repeatedly enforce patient to stay in the hospital and consider initiation of dialysis in the setting of hyperkalemia and worsening renal function but patient was adamant against initiation of dialysis and wants to go home  Patient's  was also present at the time of consultation were also agreed with patient's decision  I have discussed overall case with ER physician who has later talked to patient and patient now is agreeing to stay in the hospital but continued to refuse for dialysis and also understand severe and life-threatening complication of worsening renal function and hyperkalemia without initiation of dialysis      Plan to give Kayexalate 30 g PO x1 dose now and recheck potassium level again tomorrow  I will also plan to check urine lytes and renal ultrasound for further evaluation  Will also plan to check renal hepatitis panel with tomorrow's lab  Will consider initiation of dialysis if patient is agreeable in the future  As mentioned earlier patient understands severe and life-threatening complication of worsening renal function without initiation of dialysis    Plan to avoid ACE-I/ARB and NSAID for time being      Admission Orders:    URINE  RANDOM NA, CREATININE, CHLORIDE, OSMOLALITY,UREA NITROGEN   URINE CULTURE   MRI BRAIN  BLLE VENOUS CUPLES    US RETROPERITONEAL  CAROTID COMPLETE STUDY   ECHO  CONSULT   NEPHROLOGY  PT OT AND SPEECH EVAL AND TREAT  FALL PRECAUTIONS  NEURO CHECKS  Q2 HR  SEQUENTIAL COMPRESSION DEVICE   CONSULT NEUROLOGY  FINGERSTICK GLUCOSE BEFORE MEALS AND AT  BEDTIME  TELEMETRY        Scheduled Meds:   amLODIPine 5 mg Oral Daily   atorvastatin 80 mg Oral Daily With Dinner   carvedilol 6 25 mg Oral BID With Meals   chlorhexidine 15 mL Swish & Spit Q12H [address]   ciprofloxacin 250 mg Oral Q18H   heparin (porcine) 7,500 Units Subcutaneous Q8H [address]   hydrALAZINE 20 mg Intravenous Once   insulin detemir 10 Units Subcutaneous HS   insulin lispro 1-5 Units Subcutaneous HS   insulin lispro 1-6 Units Subcutaneous TID AC     Continuous Infusions:    PRN Meds: influenza vaccine    pneumococcal 13-valent conjugate vaccine

## 2017-11-27 ENCOUNTER — APPOINTMENT (INPATIENT)
Dept: ULTRASOUND IMAGING | Facility: HOSPITAL | Age: 57
DRG: 045 | End: 2017-11-27
Payer: COMMERCIAL

## 2017-11-27 ENCOUNTER — APPOINTMENT (INPATIENT)
Dept: MRI IMAGING | Facility: HOSPITAL | Age: 57
DRG: 045 | End: 2017-11-27
Payer: COMMERCIAL

## 2017-11-27 PROBLEM — N25.81 SECONDARY HYPERPARATHYROIDISM OF RENAL ORIGIN (HCC): Status: ACTIVE | Noted: 2017-11-27

## 2017-11-27 LAB
ALBUMIN SERPL ELPH-MCNC: 3.73 G/DL (ref 3.5–5)
ALBUMIN SERPL ELPH-MCNC: 51.8 % (ref 52–65)
ALPHA1 GLOB SERPL ELPH-MCNC: 0.35 G/DL (ref 0.1–0.4)
ALPHA1 GLOB SERPL ELPH-MCNC: 4.8 % (ref 2.5–5)
ALPHA2 GLOB SERPL ELPH-MCNC: 0.98 G/DL (ref 0.4–1.2)
ALPHA2 GLOB SERPL ELPH-MCNC: 13.6 % (ref 7–13)
ANION GAP SERPL CALCULATED.3IONS-SCNC: 13 MMOL/L (ref 4–13)
BACTERIA UR CULT: NORMAL
BASOPHILS # BLD AUTO: 0.03 THOUSANDS/ΜL (ref 0–0.1)
BASOPHILS NFR BLD AUTO: 0 % (ref 0–1)
BETA GLOB ABNORMAL SERPL ELPH-MCNC: 0.35 G/DL (ref 0.4–0.8)
BETA1 GLOB SERPL ELPH-MCNC: 4.9 % (ref 5–13)
BETA2 GLOB SERPL ELPH-MCNC: 7.4 % (ref 2–8)
BETA2+GAMMA GLOB SERPL ELPH-MCNC: 0.53 G/DL (ref 0.2–0.5)
BUN SERPL-MCNC: 55 MG/DL (ref 5–25)
CALCIUM SERPL-MCNC: 9.2 MG/DL (ref 8.3–10.1)
CHLORIDE SERPL-SCNC: 104 MMOL/L (ref 100–108)
CO2 SERPL-SCNC: 23 MMOL/L (ref 21–32)
CREAT SERPL-MCNC: 6.64 MG/DL (ref 0.6–1.3)
EOSINOPHIL # BLD AUTO: 0.12 THOUSAND/ΜL (ref 0–0.61)
EOSINOPHIL NFR BLD AUTO: 1 % (ref 0–6)
ERYTHROCYTE [DISTWIDTH] IN BLOOD BY AUTOMATED COUNT: 13.5 % (ref 11.6–15.1)
GAMMA GLOB ABNORMAL SERPL ELPH-MCNC: 1.26 G/DL (ref 0.5–1.6)
GAMMA GLOB SERPL ELPH-MCNC: 17.5 % (ref 12–22)
GFR SERPL CREATININE-BSD FRML MDRD: 7 ML/MIN/1.73SQ M
GLUCOSE SERPL-MCNC: 118 MG/DL (ref 65–140)
GLUCOSE SERPL-MCNC: 150 MG/DL (ref 65–140)
GLUCOSE SERPL-MCNC: 163 MG/DL (ref 65–140)
GLUCOSE SERPL-MCNC: 239 MG/DL (ref 65–140)
GLUCOSE SERPL-MCNC: 284 MG/DL (ref 65–140)
HCT VFR BLD AUTO: 31.8 % (ref 34.8–46.1)
HGB BLD-MCNC: 10.5 G/DL (ref 11.5–15.4)
IGG/ALB SER: 1.07 {RATIO} (ref 1.1–1.8)
LYMPHOCYTES # BLD AUTO: 3.45 THOUSANDS/ΜL (ref 0.6–4.47)
LYMPHOCYTES NFR BLD AUTO: 31 % (ref 14–44)
MAGNESIUM SERPL-MCNC: 1.8 MG/DL (ref 1.6–2.6)
MCH RBC QN AUTO: 29.4 PG (ref 26.8–34.3)
MCHC RBC AUTO-ENTMCNC: 33 G/DL (ref 31.4–37.4)
MCV RBC AUTO: 89 FL (ref 82–98)
MONOCYTES # BLD AUTO: 0.46 THOUSAND/ΜL (ref 0.17–1.22)
MONOCYTES NFR BLD AUTO: 4 % (ref 4–12)
NEUTROPHILS # BLD AUTO: 6.98 THOUSANDS/ΜL (ref 1.85–7.62)
NEUTS SEG NFR BLD AUTO: 63 % (ref 43–75)
NRBC BLD AUTO-RTO: 0 /100 WBCS
PHOSPHATE SERPL-MCNC: 4.6 MG/DL (ref 2.7–4.5)
PLATELET # BLD AUTO: 259 THOUSANDS/UL (ref 149–390)
PMV BLD AUTO: 10.1 FL (ref 8.9–12.7)
POTASSIUM SERPL-SCNC: 3.7 MMOL/L (ref 3.5–5.3)
PROT SERPL-MCNC: 7.2 G/DL (ref 6.4–8.2)
RBC # BLD AUTO: 3.57 MILLION/UL (ref 3.81–5.12)
RHEUMATOID FACT SER QL LA: NEGATIVE
RYE IGE QN: NEGATIVE
SODIUM SERPL-SCNC: 140 MMOL/L (ref 136–145)
WBC # BLD AUTO: 11.09 THOUSAND/UL (ref 4.31–10.16)

## 2017-11-27 PROCEDURE — 97116 GAIT TRAINING THERAPY: CPT

## 2017-11-27 PROCEDURE — G8988 SELF CARE GOAL STATUS: HCPCS

## 2017-11-27 PROCEDURE — 93880 EXTRACRANIAL BILAT STUDY: CPT

## 2017-11-27 PROCEDURE — 76770 US EXAM ABDO BACK WALL COMP: CPT

## 2017-11-27 PROCEDURE — 80048 BASIC METABOLIC PNL TOTAL CA: CPT | Performed by: NURSE PRACTITIONER

## 2017-11-27 PROCEDURE — 85025 COMPLETE CBC W/AUTO DIFF WBC: CPT | Performed by: NURSE PRACTITIONER

## 2017-11-27 PROCEDURE — 97167 OT EVAL HIGH COMPLEX 60 MIN: CPT

## 2017-11-27 PROCEDURE — 70551 MRI BRAIN STEM W/O DYE: CPT

## 2017-11-27 PROCEDURE — 82948 REAGENT STRIP/BLOOD GLUCOSE: CPT

## 2017-11-27 PROCEDURE — G8987 SELF CARE CURRENT STATUS: HCPCS

## 2017-11-27 PROCEDURE — 84100 ASSAY OF PHOSPHORUS: CPT | Performed by: NURSE PRACTITIONER

## 2017-11-27 PROCEDURE — 93970 EXTREMITY STUDY: CPT

## 2017-11-27 PROCEDURE — 97535 SELF CARE MNGMENT TRAINING: CPT

## 2017-11-27 PROCEDURE — 83735 ASSAY OF MAGNESIUM: CPT | Performed by: NURSE PRACTITIONER

## 2017-11-27 RX ORDER — ASPIRIN 81 MG/1
81 TABLET, CHEWABLE ORAL DAILY
Status: DISCONTINUED | OUTPATIENT
Start: 2017-11-27 | End: 2017-11-29 | Stop reason: HOSPADM

## 2017-11-27 RX ORDER — ASPIRIN 325 MG
325 TABLET ORAL DAILY
Status: DISCONTINUED | OUTPATIENT
Start: 2017-11-27 | End: 2017-11-27

## 2017-11-27 RX ORDER — CALCITRIOL 0.25 UG/1
0.25 CAPSULE, LIQUID FILLED ORAL DAILY
Status: DISCONTINUED | OUTPATIENT
Start: 2017-11-27 | End: 2017-11-29 | Stop reason: HOSPADM

## 2017-11-27 RX ADMIN — CALCITRIOL 0.25 MCG: 0.25 CAPSULE ORAL at 10:22

## 2017-11-27 RX ADMIN — CARVEDILOL 12.5 MG: 12.5 TABLET, FILM COATED ORAL at 09:40

## 2017-11-27 RX ADMIN — INSULIN LISPRO 1 UNITS: 100 INJECTION, SOLUTION INTRAVENOUS; SUBCUTANEOUS at 09:41

## 2017-11-27 RX ADMIN — HEPARIN SODIUM 7500 UNITS: 5000 INJECTION, SOLUTION INTRAVENOUS; SUBCUTANEOUS at 21:44

## 2017-11-27 RX ADMIN — CHLORHEXIDINE GLUCONATE 15 ML: 1.2 RINSE ORAL at 09:40

## 2017-11-27 RX ADMIN — ATORVASTATIN CALCIUM 80 MG: 40 TABLET, FILM COATED ORAL at 17:14

## 2017-11-27 RX ADMIN — INSULIN LISPRO 3 UNITS: 100 INJECTION, SOLUTION INTRAVENOUS; SUBCUTANEOUS at 13:13

## 2017-11-27 RX ADMIN — CHLORHEXIDINE GLUCONATE 15 ML: 1.2 RINSE ORAL at 21:44

## 2017-11-27 RX ADMIN — INSULIN LISPRO 2 UNITS: 100 INJECTION, SOLUTION INTRAVENOUS; SUBCUTANEOUS at 21:45

## 2017-11-27 RX ADMIN — CIPROFLOXACIN HYDROCHLORIDE 250 MG: 250 TABLET, FILM COATED ORAL at 05:12

## 2017-11-27 RX ADMIN — INSULIN DETEMIR 10 UNITS: 100 INJECTION, SOLUTION SUBCUTANEOUS at 21:45

## 2017-11-27 RX ADMIN — CIPROFLOXACIN HYDROCHLORIDE 250 MG: 250 TABLET, FILM COATED ORAL at 23:32

## 2017-11-27 RX ADMIN — HEPARIN SODIUM 7500 UNITS: 5000 INJECTION, SOLUTION INTRAVENOUS; SUBCUTANEOUS at 05:12

## 2017-11-27 RX ADMIN — CARVEDILOL 12.5 MG: 12.5 TABLET, FILM COATED ORAL at 17:13

## 2017-11-27 RX ADMIN — HEPARIN SODIUM 7500 UNITS: 5000 INJECTION, SOLUTION INTRAVENOUS; SUBCUTANEOUS at 15:57

## 2017-11-27 RX ADMIN — AMLODIPINE BESYLATE 10 MG: 10 TABLET ORAL at 09:40

## 2017-11-27 RX ADMIN — ASPIRIN 81 MG 81 MG: 81 TABLET ORAL at 13:12

## 2017-11-27 NOTE — PROGRESS NOTES
NEPHROLOGY PROGRESS NOTE    Patient: Rajni Ballard               Sex: female          DOA: 11/25/2017 11:24 AM   YOB: 1960        Age:  59 y o         LOS:  LOS: 2 days     REASON FOR THE CONSULTATION:  Further management of worsening renal function    HPI     This is a 89year-old female initially admitted for right upper extremities weakness also found to have hyperkalemia and worsening renal function on admission  SUBJECTIVE     - found to be nonoliguric and afebrile overnight  Also updated patient's  at bedside today  Patient denies nausea / vomiting / headache / dizziness / SOB / chest pain today    - Reviewed last 24 hrs events     CURRENT MEDICATIONS       Current Facility-Administered Medications:     amLODIPine (NORVASC) tablet 10 mg, 10 mg, Oral, Daily, KATIE Dyer, 10 mg at 11/27/17 0940    atorvastatin (LIPITOR) tablet 80 mg, 80 mg, Oral, Daily With Dyana [place]s [place], EFRAINNP, 80 mg at 11/26/17 1734    calcitriol (ROCALTROL) capsule 0 25 mcg, 0 25 mcg, Oral, Daily, Nancy Ortez MD, 0 25 mcg at 11/27/17 1022    carvedilol (COREG) tablet 12 5 mg, 12 5 mg, Oral, BID With Meals, KATIE Dyer, 12 5 mg at 11/27/17 0940    chlorhexidine (PERIDEX) 0 12 % oral rinse 15 mL, 15 mL, Swish & Spit, Q12H [facility], Keanu Phan, EFRAINNP, 15 mL at 11/27/17 0940    ciprofloxacin (CIPRO) tablet 250 mg, 250 mg, Oral, Q18H, EFRAIN DyerNP, 250 mg at 11/27/17 3322    heparin (porcine) subcutaneous injection 7,500 Units, 7,500 Units, Subcutaneous, Q8H [facility], 7,500 Units at 11/27/17 0512 **AND** Platelet count, , , Once, KATIE Dyer    influenza inactivated quadrivalent vaccine (FLULAVAL) IM injection 0 5 mL, 0 5 mL, Intramuscular, Prior to discharge, Rogerio Robles MD    insulin detemir (LEVEMIR) subcutaneous injection 10 Units, 10 Units, Subcutaneous, [facility] KATIE Newton, 10 Units at 11/26/17 2057    insulin lispro (HumaLOG) 100 units/mL subcutaneous injection 1-5 Units, 1-5 Units, Subcutaneous, HS, KATIE Thurston, 4 Units at 11/26/17 2054    insulin lispro (HumaLOG) 100 units/mL subcutaneous injection 1-6 Units, 1-6 Units, Subcutaneous, TID AC, 1 Units at 11/27/17 0941 **AND** Fingerstick Glucose (POCT), , , TID AC, KATIE Thurston    pneumococcal 13-valent conjugate vaccine (PREVNAR-13) IM injection 0 5 mL, 0 5 mL, Intramuscular, Prior to discharge, Yoly Avila MD    OBJECTIVE     Current Weight: Weight - Scale: 124 kg (272 lb 4 3 oz)  Vitals:    11/27/17 0700   BP: 153/74   Pulse: 71   Resp: 20   Temp: 97 8 °F (36 6 °C)   SpO2: 97%       Intake/Output Summary (Last 24 hours) at 11/27/17 1140  Last data filed at 11/26/17 1400   Gross per 24 hour   Intake              480 ml   Output              400 ml   Net               80 ml       PHYSICAL EXAMINATION     GEN:  Awake and not in acute distress  RS:  Bilateral equal air entry, no wheezing  CVS:  S1-S2 heard  Abdomen:  Soft, nontender, nondistended, positive bowel sounds  Extremities:  No edema, skin is warm on touch  CNS:  Awake and follows command, right upper extremities weakness  HEENT:  Pink conjunctiva, no JVD, head is atraumatic  Psychiatric:  Normal mood and affect, not suicidal  Musculoskeletal:  No gross bony deformity seen in hands  Lymph Node:  No palpable cervical lymphadenopathy    LAB RESULTS       Results from last 7 days  Lab Units 11/27/17  0606 11/26/17  0542 11/25/17  1900 11/25/17  1245 11/25/17  1218   WBC Thousand/uL 11 09* 10 89*  --   --  9 92   HEMOGLOBIN g/dL 10 5* 10 9*  --   --  10 8*   HEMATOCRIT % 31 8* 33 2*  --   --  33 7*   PLATELETS Thousands/uL 259 251 227  --  259   SODIUM mmol/L 140 141  --  142  --    POTASSIUM mmol/L 3 7 4 0  --  6 4*  --    CHLORIDE mmol/L 104 108  --  107  --    CO2 mmol/L 23 23  --  23  --    BUN mg/dL 55* 50*  --  58*  --    CREATININE mg/dL 6 64* 6 22*  --  6 35*  --    EGFR ml/min/1 73sq m 7 8  --  8  --    CALCIUM mg/dL 9 2 9 4  --  9 4  --    MAGNESIUM mg/dL 1 8 1 7  --   --   --    PHOSPHORUS mg/dL 4 6* 4 1  --   --   --    ALBUMIN g/dL  --  3 1*  --  3 5  --    TOTAL PROTEIN g/dL  --   --   --  8 0  --    GLUCOSE RANDOM mg/dL 163* 150*  --  245*  --        I have personally reviewed the old medical records and patient's previously known baseline creatinine level is unknown    RADIOLOGY RESULTS      Results for orders placed during the hospital encounter of 11/25/17   XR chest 2 views    Narrative CHEST     INDICATION:  Confusion  Right hand weakness    COMPARISON:  None    VIEWS:  Frontal and lateral projections    IMAGES:  2    FINDINGS:         Cardiomediastinal silhouette appears unremarkable  Right diaphragm is elevated  Lungs are clear  No evidence of consolidation pulmonary edema pleural effusion    Visualized osseous structures appear within normal limits for the patient's age  Impression No active pulmonary disease  Workstation performed: [id number]         PLAN / RECOMMENDATIONS      1  DALLAS  Multifactorial and suspect underlying progressive CKD stage 5  Upon review of old medical record patient's baseline creatinine level is unknown but on 7/14/17 patient was found to have creatinine of 5 58  During current hospital stay patient was found to have further worsening renal function with current creatinine of 6 64  Patient was also diagnosed with nephrotic range proteinuria and currently pending serological workup to rule out underlying glomerulonephritis  Would not consider proceeding to renal biopsy in the setting of use of blood thinner including aspirin  Discussed in length with patient that current amount of proteinuria is a high risk factor for further worsening of renal function    Pending renal ultrasound but looking at overall clinical picture I think that patient has underlying CKD stage 5 and with current nephrotic range proteinuria patient is at high risk for further worsening of renal function and I would personally consider initiation of dialysis before patient developed any uremic symptoms     Patient and family understand life-threatening complication of further worsening of renal function  Patient is continued to refuse for initiation of dialysis at this point  Will continue to monitor renal function while in the hospital and reassess renal function again tomorrow and consider initiation of dialysis during current hospital stay if patient is agreeable  I would not consider initiation of ACE-I or ARB in the setting of current renal function with recent hyperkalemia  2   Secondary hyperparathyroidism of renal origin  Patient was found to have elevated PTH level of 606 and will plan to start patient on calcitriol 0 25 mcg PO daily  Currently calcium and phosphorus levels are at goal     3   Proteinuria  Nephrotic range, pending serological workup  Would not consider proceeding to renal biopsy as mentioned earlier  Would not consider ACE-I or ARB in the setting of current renal function and recent hyperkalemia either  Patient and family understand the current amount of proteinuria is a high risk factor for further worsening of renal function  Plan was also d/w Monet Ornelas MD  Nephrology  11/27/2017        Portions of the record may have been created with voice recognition software  Occasional wrong word or "sound a like" substitutions may have occurred due to the inherent limitations of voice recognition software  Read the chart carefully and recognize, using context, where substitutions have occurred

## 2017-11-27 NOTE — CONSULTS
PHYSICAL MEDICINE AND REHABILITATION CONSULT NOTE  Nilson Pinto 59 y o  female MRN: [id number]  Unit/Bed#: -01 Encounter: [id number]    Requested by (Physician/Service): Ivan Nolsaco MD  Reason for Consultation:  Assessment of rehabilitation needs  Chief Complaint: Right upper extremity weakness    History of Present Illness:  Nilson Pinto is a 59 y o  female who  has a past medical history of Atrial fibrillation ([place] [address] ); CKD (chronic kidney disease); Diabetes mellitus ([place] [address] ); Hyperlipidemia; Hypertension; and Stroke (cerebrum) ([place] [address] ) (11/25/2017)  and presented to the [address] [place] [address] Road  with right hand weakness which started 2 weeks prior to admission  She was admitted to ICU on 11/25/17  CT scan of the brain showed evidence of subacute left posterior parietal temporal CVA in addition to old left basal ganglia CVA  Patient was not a candidate for tPA  Neurology was consulted   and  it was recommended for patient to continue aspirin  Restrictions include:  none    Hospital Course/Comorbidities:   Comorbidities:  none      Last Weight:    Wt Readings from Last 1 Encounters:   11/27/17 124 kg (272 lb 4 3 oz)     Last BMI:  Body mass index is 36 93 kg/m²  Functional History:     Prior to Admission:  Independent with ADLs, functional mobility and IADLs  Present:  Physical Therapy:  Minimal assistance with transfers and gait  Ambulated 50 feet x 3 with minimal assistance with short stride and step to pattern         Occupational Therapy:  Minimal assistance with grooming, upper body bathing, max assist with lower body bathing, minimal assistance with upper body dressing,  supervision with eating                    Past Medical History:    Past Medical History:   Diagnosis Date    Atrial fibrillation ([place] [address] )     CKD (chronic kidney disease)     Diabetes mellitus ([place] [address] )     type II    Hyperlipidemia     Hypertension     Stroke (cerebrum) ([place] [address] ) 11/25/2017        Past Surgical History:    History reviewed  No pertinent surgical history       Medications:    Current Facility-Administered Medications:     amLODIPine (NORVASC) tablet 10 mg, 10 mg, Oral, Daily, KATIE Oviedo, 10 mg at 11/27/17 0940    aspirin chewable tablet 81 mg, 81 mg, Oral, Daily, Zeynep Hernandez MD, 81 mg at 11/27/17 1312    atorvastatin (LIPITOR) tablet 80 mg, 80 mg, Oral, Daily With Bisi RadKATIE Metcalf, 80 mg at 11/26/17 1734    calcitriol (ROCALTROL) capsule 0 25 mcg, 0 25 mcg, Oral, Daily, Lynnette Saucedo MD, 0 25 mcg at 11/27/17 1022    carvedilol (COREG) tablet 12 5 mg, 12 5 mg, Oral, BID With Meals, KATIE Oviedo, 12 5 mg at 11/27/17 0940    chlorhexidine (PERIDEX) 0 12 % oral rinse 15 mL, 15 mL, Swish & Spit, Q12H [address], Nupur KATIE Newton, 15 mL at 11/27/17 0940    ciprofloxacin (CIPRO) tablet 250 mg, 250 mg, Oral, Q18H, KATIE Oviedo, 250 mg at 11/27/17 7623    heparin (porcine) subcutaneous injection 7,500 Units, 7,500 Units, Subcutaneous, Q8H [address], 7,500 Units at 11/27/17 0512 **AND** Platelet count, , , Once, KATIE Oviedo    influenza inactivated quadrivalent vaccine (FLULAVAL) IM injection 0 5 mL, 0 5 mL, Intramuscular, Prior to discharge, Candelario Wilson MD    insulin detemir (LEVEMIR) subcutaneous injection 10 Units, 10 Units, Subcutaneous, HS, KATIE Oviedo, 10 Units at 11/26/17 2057    insulin lispro (HumaLOG) 100 units/mL subcutaneous injection 1-5 Units, 1-5 Units, Subcutaneous, HS, KATIE Oviedo, 4 Units at 11/26/17 2054    insulin lispro (HumaLOG) 100 units/mL subcutaneous injection 1-6 Units, 1-6 Units, Subcutaneous, TID AC, 3 Units at 11/27/17 1313 **AND** Fingerstick Glucose (POCT), , , TID AC, Olga Born, CRNP    pneumococcal 13-valent conjugate vaccine (PREVNAR-13) IM injection 0 5 mL, 0 5 mL, Intramuscular, Prior to discharge, Holly Mart MD    Allergies:   No Known Allergies     Family History:    Family History   Problem Relation Age of Onset    Kidney failure Sister        Social History:    Social History     Social History    Marital status: /Civil Union     Spouse name: N/A    Number of children: N/A    Years of education: N/A     Social History Main Topics    Smoking status: Former Smoker    Smokeless tobacco: Never Used    Alcohol use No    Drug use: No    Sexual activity: Not Asked     Other Topics Concern    None     Social History Narrative    None        Ren Stephenson   Lives in a two-level house performs ADLs on 1 level , able to live on main level with bedroom /bathroom, steps to enter with rails, FF down to basement level where patient and  reside  Review of systems:  Neurologic: Right common weakness  All other review of systems in a 10 point system examination were reviewed and were negative  Physical Exam:  /74   Pulse 71   Temp 97 8 °F (36 6 °C) (Oral)   Resp 20   Ht 6' (1 829 m)   Wt 124 kg (272 lb 4 3 oz)   SpO2 97%   BMI 36 93 kg/m²        Intake/Output Summary (Last 24 hours) at 11/27/17 1526  Last data filed at 11/27/17 0930   Gross per 24 hour   Intake              320 ml   Output                0 ml   Net              320 ml       Body mass index is 36 93 kg/m²  General:  Not in acute distress  Heent: mucous membranes are moist  Pulmonary:  Good air entry bilaterally  Cardiovascular:  RRR  Abdomen:  soft  Skin/Extremity:  Warm and dry, no edema  Neurologic:    Sensations intact to light touch bilaterally  Follows 3 step commands  No difficulty with repetition noted  Cranial nerves 2-12 intact  Gait was not evaluated   Deep tendon reflexes are intact  Toes are downgoing    Psych: Appropriate affect, alert and oriented to person, place and time   Musculoskeletal - Strength:   Right  Left  Site  Right  Left  Site    5- 5  S Ab: Shoulder Abductors  5 5  HF: Hip Flexors    5 5  EF: Elbow Flexors  5/   5  5/   5  H Ab: Hip Abductors/   H Ad: Hip Adductors    5-  5  EE: Elbow Extensors  5  5  KF: Knee Flexors    5  5  WE: Wrist Extensors  5  5  KE: Knee Extensors    4+  5  FF: Finger Flexors  5  5  DR: Dorsi Flexors    4+ 5  HI: Hand Intrinsics  5  5  PF: Plantar Flexors          Laboratory:    Lab Results   Component Value Date    HGB 10 5 (L) 11/27/2017    HCT 31 8 (L) 11/27/2017    WBC 11 09 (H) 11/27/2017      Lab Results   Component Value Date    BUN 55 (H) 11/27/2017     11/27/2017    K 3 7 11/27/2017     11/27/2017    GLUCOSE 163 (H) 11/27/2017    CREATININE 6 64 (H) 11/27/2017      Lab Results   Component Value Date    PROTIME 13 4 11/25/2017    INR 1 00 11/25/2017       Imaging:  Xr Chest 2 Views    Result Date: 11/25/2017  Impression: No active pulmonary disease  Workstation performed: [id number]     Ct Head Without Contrast    Result Date: 11/25/2017  Impression: Apparent evolving nonhemorrhagic left posterior parietal infarct  Old left basal ganglia infarct with adjacent white matter changes of uncertain chronicity  Workstation performed: [id number]     Mri Brain Wo Contrast    Result Date: 11/27/2017  Impression: Multiple new infarcts identified within the left hemisphere involving the frontal lobe, parietal lobe and posterior temporal lobe  Posterior temporal lobe and parietal infarcts demonstrate petechial hemorrhage barely visualized on hemosiderin sensitive imaging  Atrophy and chronic microangiopathic change  Old hemorrhagic infarction within the left basal ganglia  Old right cerebellar infarction with volume loss  Workstation performed: [id number]       Assessment and Recommendations:    Subacute left MCA posterior division CVA with right upper extremity weakness  Other comorbidities include hypertension, diabetes mellitus, acute on chronic kidney disease      Impairments:  Impaired functional mobility and ability to perform ADL's  - Continue PT/OT while inpatient  - Pt is unable to safely return home until she is at the supervision/contact-guard functional level / modified-independent functional level  - Based upon patient's current functional level, we recommend referral to acute rehabilitation facility to allow for achievement of modified-independent functional level      - Disposition unclear at this point in time but inpatient rehab a possibility in the near future pending achievement of clinically stability, potential for functional progress  Thank you for allowing the PM&R service to participate in the care of this patient  We will continue to follow Mirian Moore progress with you   Please do not hesitate to call with questions or concerns    Tarsha Frost MD   Physical Medicine and Rehabilitation

## 2017-11-27 NOTE — PLAN OF CARE
Problem: OCCUPATIONAL THERAPY ADULT  Goal: Performs self-care activities at highest level of function for planned discharge setting  See evaluation for individualized goals  Treatment Interventions: ADL retraining, Functional transfer training, Visual perceptual retraining, UE strengthening/ROM, Endurance training, Cognitive reorientation, Patient/family training, Equipment evaluation/education, Neuromuscular reeducation, Fine motor coordination activities, Compensatory technique education, Continued evaluation, Cardiac education, Energy conservation, Activityengagement          See flowsheet documentation for full assessment, interventions and recommendations  Limitation: Decreased ADL status, Decreased UE ROM, Decreased UE strength, Decreased Safe judgement during ADL, Decreased cognition, Decreased endurance, Decreased fine motor control, Decreased self-care trans, Decreased high-level ADLs  Prognosis: Good  Assessment: Patient is a 59 y o  female seen for OT evaluation s/p admit to [id number] [place] [address] on 11/25/2017 w/Acute on chronic kidney failure ([facility] [address] )  Commorbidities affecting patient's functional performance at time of assessment include: CT of brain revealed subacute L parietal stroke in evolution in the ED, Right UE weakness, DM type 2, hyperlipidemia, HTN, CKD, AFib  Prior to admission, Patient and  reporting independent with self care tasks, lives with  and family in a 2 story house, full flight to living area  Ambulatory without any devices, using SPC ocassionally  Personal factors affecting patient at time of initial evaluation include: steps to enter, limited insight into deficits, flat affect, decreased initiation and engagement, difficulty performing ADLs and difficulty performing IADLs   Upon evaluation, patient requires minimal  assist for UB ADLs, moderate and maximal assist for LB ADLs, transfers and functional ambulation in room and bathroom with minimal  assist, with the use of Rolling Walker  Occupational performance is affected by the following deficits: orientation, attention span, flat affect, impulsive behavior, decreased functional use of BUEs, in hand manipulation deficit with impaired reach, grasp and coordination, limited active ROM, decreased muscle strength, impaired fine motor coordination, dynamic sit/ stand balance deficit with poor standing tolerance time for self care and functional mobility, decreased activity tolerance, impaired memory, impaired problem solving, decreased safety awareness and postural control and postural alignment deficit, requiring external assistance to complete transitional movements  Therapist completed  extensive additional review of medical records and additional review of physical, cognitive or psychosocial history, clinical examination identifying 5 or more performance deficits, clinical decision making of a high complexity , consistent with a high complexity level evaluation  Patient to benefit from continued Occupational Therapy treatment while in the hospital to address deficits as defined above and maximize level of functional independence with ADLs and functional mobility  Occupational Performance areas to address include: grooming , bathing/ shower, dressing, toilet hygiene, transfer to all surfaces, functional mobility, functional communication, emergency response, IADLs: safety procedures, Leisure Participation and Social participation     Recommendation: Physiatry Consult  OT Discharge Recommendation: Short Term Rehab (IP acute rehab)  OT - OK to Discharge: Yes (IP Acute/ STR when medically cleared)

## 2017-11-27 NOTE — OCCUPATIONAL THERAPY NOTE
Occupational Therapy Evaluation        Patient Name: Kostas Queen  [id number] Date: 11/27/2017 11/27/17 0914   Note Type   Note type Eval/Treat   Restrictions/Precautions   Weight Bearing Precautions Per Order No   Other Precautions Telemetry; Fall Risk;Cognitive   Pain Assessment   Pain Assessment No/denies pain   Pain Score No Pain   Home Living   Type of Home House   Home Layout Two level;Performs ADLs on one level; Able to live on main level with bedroom/bathroom;Stairs to enter with rails  (full flight to enter thru the basemant/ garage)   Bathroom Shower/Tub Tub/shower unit   [address] in [address]  ([address] using [place])   Additional Comments Patient participated in Skilled OT session this date with interventions consisting of ADL re training with the use of correct body mechnaics, Energy Conservation techniques, Work simplification skills , deep breathing technique, safety awareness and fall prevention techniques,  therapeutic activities to: increase activity tolerance, increase cardiovascular endurance , increase dynamic sit/ stand balance during functional activity  and increase OOB/ sitting tolerance   Patient agreeable to OT treatment session, upon arrival patient was found supine in bed, alert, responsive  and in no apparent distress  In comparison to previous session, patient with improvements in activity tolerance, completed all activities without use of O2, completed UB ADLs with set up assist and  min assist x1  Patient requiring verbal cues for safety and verbal cues for correct technique  Patient continues to be functioning below baseline level, occupational performance remains limited secondary to factors listed above and increased risk for falls and injury  From OT standpoint, recommendation at time of d/c would be Short Term Rehab     Patient to benefit from continued Occupational Therapy treatment while in the hospital to address deficits as defined above and maximize level of functional independence with ADLs and functional mobility  Prior Function   Level of [place] Independent with ADLs and functional mobility   Lives With Spouse   Receives Help From Family   ADL Assistance Independent   IADLs Independent   Falls in the last 6 months 0   Vocational Retired   Lifestyle   Autonomy Patient and  reporting independent with self care tasks, lives with  and family in a 2 story house, full flight to living area  Ambulatory without any devices, using SPC ocassionally  Reciprocal Relationships Supportive Family   Psychosocial   Psychosocial (WDL) X   Patient Behaviors/Mood Tearful;Calm; Cooperative   Ability to Express Feelings Able to express   Ability to Express Needs Needs assistance   Ability to Express Thoughts Needs assistance   Ability to Understand Others Usually understands   ADL   Eating Assistance 5  Supervision/Setup   Grooming Assistance 4  Minimal Assistance   UB Bathing Assistance 4  Minimal Assistance   LB Bathing Assistance 2  Maximal Assistance   UB Dressing Assistance 4  Minimal Assistance   LB Dressing Assistance 2  Maximal [address]  3  Moderate Assistance   Functional Assistance 3  Moderate Assistance   Bed Mobility   Rolling R 5  Supervision   Additional items Assist x 1;HOB elevated; Increased time required;Verbal cues   Rolling L 5  Supervision   Additional items Assist x 1;HOB elevated; Increased time required   Supine to Sit 5  Supervision   Additional items Assist x 1; Increased time required;Verbal cues   Sit to Supine 5  Supervision   Additional items Assist x 1;Bedrails;Verbal cues;LE management   Transfers   Sit to Stand 5  Supervision   Additional items Assist x 1; Impulsive;Verbal cues   Stand to Sit 5  Supervision   Additional items Assist x 1;Bedrails; Increased time required   Functional Mobility   Functional Mobility 4 Minimal assistance   Additional items Rolling walker   Balance   Static Sitting Good   Dynamic Sitting Good   Static Standing Fair +   Dynamic Standing Fair +   Ambulatory Fair +   Activity Tolerance   Activity Tolerance Patient limited by fatigue   RUE Assessment   RUE Assessment X   RUE Overall AROM   R Elbow Supination 45   R Elbow Pronation 45   R Wrist Flexion full flexion to neutral   R Wrist Extension 0- no active wrist extension   R Mass Grasp incomplete grasp by 1/3, limited use of thumb during active - goes into adduction when holding onto grab bar or walker  RUE Strength   RUE Overall Strength Deficits  (proximal- 3+/5, distal 2+/5)   LUE Assessment   LUE Assessment WFL   LUE Strength   LUE Overall Strength Within Functional Limits - able to perform ADL tasks with strength   Hand Function   Gross Motor Coordination Functional   Fine Motor Coordination Impaired   Sensation   Light Touch No apparent deficits  (BUEs)   Proprioception   Proprioception No apparent deficits   Vision-Basic Assessment   Current Vision Wears glasses only for reading   Patient Visual Report (No significant changes reported)   Vision - Complex Assessment   Tracking Able to track stimulus in all quads without difficulty   Acuity Able to read clock/calendar on wall without difficulty   Perception   Inattention/Neglect Appears intact   Cognition   Overall Cognitive Status Impaired   Arousal/Participation Alert; Responsive; Cooperative   Attention Attends with cues to redirect   Orientation Level Oriented to person;Oriented to place; Disoriented to time;Oriented to situation  (pt unable to recall name of hospital)   Memory Decreased recall of biographical information;Decreased recall of recent events;Decreased recall of precautions   Following Commands Follows one step commands with increased time or repetition   Assessment   Limitation Decreased ADL status; Decreased UE ROM; Decreased UE strength;Decreased Safe judgement during ADL;Decreased cognition;Decreased endurance;Decreased fine motor control;Decreased self-care trans;Decreased high-level ADLs   Prognosis Good   Assessment Patient is a 59 y o  female seen for OT evaluation s/p admit to [id number] [place] [address] on 11/25/2017 w/Acute on chronic kidney failure ([facility] [address] )  Commorbidities affecting patient's functional performance at time of assessment include: CT of brain revealed subacute L parietal stroke in evolution in the ED, Right UE weakness, DM type 2, hyperlipidemia, HTN, CKD, AFib  Prior to admission, Patient and  reporting independent with self care tasks, lives with  and family in a 2 story house, full flight to living area  Ambulatory without any devices, using SPC ocassionally  Personal factors affecting patient at time of initial evaluation include: steps to enter, limited insight into deficits, flat affect, decreased initiation and engagement, difficulty performing ADLs and difficulty performing IADLs  Upon evaluation, patient requires minimal  assist for UB ADLs, moderate and maximal assist for LB ADLs, transfers and functional ambulation in room and bathroom with minimal  assist, with the use of Rolling Walker  Occupational performance is affected by the following deficits: orientation, attention span, flat affect, impulsive behavior, decreased functional use of BUEs, in hand manipulation deficit with impaired reach, grasp and coordination, limited active ROM, decreased muscle strength, impaired fine motor coordination, dynamic sit/ stand balance deficit with poor standing tolerance time for self care and functional mobility, decreased activity tolerance, impaired memory, impaired problem solving, decreased safety awareness and postural control and postural alignment deficit, requiring external assistance to complete transitional movements   Therapist completed  extensive additional review of medical records and additional review of physical, cognitive or psychosocial history, clinical examination identifying 5 or more performance deficits, clinical decision making of a high complexity , consistent with a high complexity level evaluation  Patient to benefit from continued Occupational Therapy treatment while in the hospital to address deficits as defined above and maximize level of functional independence with ADLs and functional mobility  Occupational Performance areas to address include: grooming , bathing/ shower, dressing, toilet hygiene, transfer to all surfaces, functional mobility, functional communication, emergency response, IADLs: safety procedures, Leisure Participation and Social participation  Plan   Treatment Interventions ADL retraining;Functional transfer training;Visual perceptual retraining;UE strengthening/ROM; Endurance training;Cognitive reorientation;Patient/family training;Equipment evaluation/education; Neuromuscular reeducation; Fine motor coordination activities; Compensatory technique education;Continued evaluation;Cardiac education; Energy conservation; Activityengagement   Goal Expiration Date 12/11/17   Treatment Day 1   OT Frequency 3-5x/wk   Additional Treatment Session   Start Time 0900   End Time 0914   Treatment Assessment Patient participated in Skilled OT session this date with interventions consisting of ADL re training with the use of correct body mechnaics, safety awareness and fall prevention techniques,  therapeutic activities to: increase activity tolerance, increase standing tolerance time with unilateral UE support to complete sink level ADLs, increase cardiovascular endurance , increase dynamic sit/ stand balance during functional activity  and increase OOB/ sitting tolerance   Patient completed simulated ADLs, standing sink side, toilet transfers and functional ambulation in room with the use of RW, patient educated on transfer technique as well as correct hand/ feet placement on walker     Patient requiring frequent re direction, verbal cues for safety, verbal cues for correct technique, cognitive assistance to anticipate next step and ocassional safety reminders  Patient continues to be functioning below baseline level, occupational performance remains limited secondary to factors listed above and increased risk for falls and injury  From OT standpoint, recommendation at time of d/c would be Short Term Rehab/ IP Acute Rehab when medically cleared      Patient to benefit from continued Occupational Therapy treatment while in the hospital to address deficits as defined above and maximize level of functional independence with ADLs and functional mobility  Recommendation   Recommendation Physiatry Consult   OT Discharge Recommendation Short Term Rehab  (IP acute rehab)   OT - OK to Discharge Yes  (IP Acute/ STR when medically cleared)   Barthel Index   Feeding 10   Bathing 0   Grooming Score 0   Dressing Score 5   Bladder Score 10   Bowels Score 10   Toilet Use Score 5   Transfers (Bed/Chair) Score 10   Mobility (Level Surface) Score 0   Stairs Score 0   Barthel Index Score 50   Modified Roosevelt Scale   Modified Bruna Scale 4     Occupational Therapy goals: In 7-14 days:     1- Patient will verbalize and demonstrate use of energy conservation/ deep breathing technique and work simplification skills during functional activity with no verbal cues  2-Patient will verbalize and demonstrate good body mechanics and joint protection techniques during  ADLs/ IADLs with no verbal cues  3- Patient will increase OOB/ sitting tolerance to 2-4 hours per day for increased participation in self care and leisure tasks with no s/s of exertion  4-Patient will increase standing tolerance time to 5  minutes with unilateral UE support to complete sink level ADLs@ mod I level   5- Patient will increase sitting tolerance at edge of bed to 20 minutes to complete UB ADLs @ set up assist level    6- Patient will transfer bed to Chair / toilet at Set up assist level with AD as indicated  7- Patient will complete UB ADLs with set up assist  8- Patient will complete LB ADLs with min assist with the use of adaptive equipment  9- Patient will complete toileting hygiene with set up assist/ supervision for thoroughness  10- Patient will incorporate use of RUE as a functional assist during self care tasks with no verbal cues  Roxana Mort   11-Patient/ Family  will demonstrate competency with UE Home Exercise Program

## 2017-11-27 NOTE — PROGRESS NOTES
[facility] Progress Note  Patient: Macy Simms 59 y o  female   MRN: [id number]  PCP: Mariya Mendoza PA-C  Unit/Bed#: [facility]-01 Encounter: [id number]  Date Of Visit: 17    Assessment:    Principal Problem:    Acute on chronic kidney failure ([facility] [address] )  Active Problems:    Hyperkalemia    Essential hypertension    HLD (hyperlipidemia)    Type 1 diabetes mellitus (HCC)    UTI (urinary tract infection)    Stroke (cerebrum) (HCC)    Proteinuria      Plan:    · 1  Right hand weakness- mostlikley secondary to CVA- CT head shows left parietal stroke  Will f/u MRI brain  Neurology following  On aspirin and statin  · 2  Acute on chronic kidney injury- patient Cr  6 64  Nephrology recommending dialysis but patient refuses  Will continue to followup with nephrology recommendations  · 3  Hyperkalemia- improved after recieving kayexalate  · 4  DM-on levemir and ISS  · 5  HTN- on norvasc, carvedilol and hydralazine  · 6  UTI- on ciprofoxacin      VTE Pharmacologic Prophylaxis:   Pharmacologic: Heparin  Mechanical VTE Prophylaxis in Place: Yes    Patient Centered Rounds: I have performed bedside rounds with nursing staff today  Discussions with Specialists or Other Care Team Provider:     Education and Discussions with Family / Patient:     Time Spent for Care: 20 minutes  More than 50% of total time spent on counseling and coordination of care as described above  Current Length of Stay: 2 day(s)    Current Patient Status: Inpatient   Certification Statement: The patient will continue to require additional inpatient hospital stay due to [place], CVA    Discharge Plan / Estimated Discharge Date: Once CVA and kidney function improves  Code Status: Level 1 - Full Code      Subjective:   Patient see and examined at bedside  Patient has no new complaints      Objective:     Vitals:   Temp (24hrs), Av 5 °F (36 9 °C), Min:97 8 °F (36 6 °C), Max:98 9 °F (37 2 °C)    HR:  [68-99] 71  Resp: [17-22] 20  BP: (153-199)/(74-95) 153/74  SpO2:  [95 %-100 %] 97 %  Body mass index is 36 93 kg/m²  Input and Output Summary (last 24 hours): Intake/Output Summary (Last 24 hours) at 11/27/17 1151  Last data filed at 11/27/17 0930   Gross per 24 hour   Intake              800 ml   Output              400 ml   Net              400 ml       Physical Exam:     Physical Exam   Constitutional: She is oriented to person, place, and time  She appears well-developed and well-nourished  HENT:   Head: Normocephalic and atraumatic  Eyes: Conjunctivae and EOM are normal  Pupils are equal, round, and reactive to light  Neck: Normal range of motion  No JVD present  No tracheal deviation present  No thyromegaly present  Cardiovascular: Normal rate, regular rhythm and normal heart sounds  Exam reveals no gallop and no friction rub  No murmur heard  Pulmonary/Chest: Effort normal and breath sounds normal  No respiratory distress  She has no wheezes  She has no rales  Abdominal: Soft  Bowel sounds are normal  She exhibits no distension  There is no tenderness  There is no rebound  Musculoskeletal: Normal range of motion  She exhibits no edema  RUE strength 3/5  LUE strength 5/5  B/L LE strength 5/5   Neurological: She is oriented to person, place, and time  Vitals reviewed            Additional Data:     Labs:      Results from last 7 days  Lab Units 11/27/17  0606   WBC Thousand/uL 11 09*   HEMOGLOBIN g/dL 10 5*   HEMATOCRIT % 31 8*   PLATELETS Thousands/uL 259   NEUTROS PCT % 63   LYMPHS PCT % 31   MONOS PCT % 4   EOS PCT % 1       Results from last 7 days  Lab Units 11/27/17  0606  11/25/17  1245   SODIUM mmol/L 140  < > 142   POTASSIUM mmol/L 3 7  < > 6 4*   CHLORIDE mmol/L 104  < > 107   CO2 mmol/L 23  < > 23   BUN mg/dL 55*  < > 58*   CREATININE mg/dL 6 64*  < > 6 35*   CALCIUM mg/dL 9 2  < > 9 4   TOTAL PROTEIN g/dL  --   --  8 0   BILIRUBIN TOTAL mg/dL  --   --  0 40   ALK PHOS U/L  --   --  172* ALT U/L  --   --  17   AST U/L  --   --  29   GLUCOSE RANDOM mg/dL 163*  < > 245*   < > = values in this interval not displayed  Results from last 7 days  Lab Units 11/25/17  1218   INR  1 00       * I Have Reviewed All Lab Data Listed Above  * Additional Pertinent Lab Tests Reviewed: [facility] Admission Reviewed    Imaging:    Imaging Reports Reviewed Today Include:   Imaging Personally Reviewed by Myself Includes:      Recent Cultures (last 7 days):       Results from last 7 days  Lab Units 11/25/17  1415   URINE CULTURE  Culture too young- will reincubate       Last 24 Hours Medication List:     amLODIPine 10 mg Oral Daily   aspirin 81 mg Oral Daily   atorvastatin 80 mg Oral Daily With Dinner   calcitriol 0 25 mcg Oral Daily   carvedilol 12 5 mg Oral BID With Meals   chlorhexidine 15 mL Swish [employer] Spit Q12H [address]   ciprofloxacin 250 mg Oral Q18H   heparin (porcine) 7,500 Units Subcutaneous Q8H [address]   insulin detemir 10 Units Subcutaneous HS   insulin lispro 1-5 Units Subcutaneous HS   insulin lispro 1-6 Units Subcutaneous TID AC        Today, Patient Was Seen By: Sandeep White MD    ** Please Note: This note has been constructed using a voice recognition system   **

## 2017-11-27 NOTE — PLAN OF CARE
Problem: PHYSICAL THERAPY ADULT  Goal: Performs mobility at highest level of function for planned discharge setting  See evaluation for individualized goals  Treatment/Interventions: Functional transfer training, Elevations, Therapeutic exercise, Endurance training, Cognitive reorientation, Patient/family training, Equipment eval/education, Bed mobility, Gait training, Continued evaluation, Spoke to nursing  Equipment Recommended:  (TBD)       See flowsheet documentation for full assessment, interventions and recommendations  Outcome: Progressing  Prognosis: Good  Problem List: Impaired balance, Decreased mobility, Decreased cognition  Assessment: Pt seen for PT treatment session this date with interventions consisting of gait training w/ emphasis on improving pt's ability to ambulate level surfaces x 50' x3 with CGA provided by therapist with RW  Pt agreeable to PT treatment session upon arrival, pt found seated at EOB, in no apparent distress, A&O x 3 and responsive  In comparison to previous session, pt with improvements in leevl surface ambulation tolerance, although requiring CGA for all phases of mobility and vc for safety and sequencing, pt requiring A to place R UE onto Rw for support, note 2 episodes of uncorrected LOB requiring increased physical A  Post session: pt returned BTB, all needs in reach and RN notified of session findings/recommendations and NSG staff educated/encouraged to ambulate pt w/ use of RW  Recommend STR vs HHPT at time of d/c in order to maximize pt's functional independence and safety w/ mobility  Pt continues to be functioning below baseline level, and remains limited 2* factors listed above and including at risk for falls  PT will continue to see pt while here in order to address the deficits listed above and provide interventions consistent w/ POC in effort to achieve STGs    Barriers to Discharge: Inaccessible home environment  Barriers to Discharge Comments: PT unable to assess level surface amb x household distances or elevations at this time  Recommendation: Short-term skilled PT (vs HHPT w/ family pending progress)     PT - OK to Discharge: No    See flowsheet documentation for full assessment

## 2017-11-28 LAB
ANION GAP SERPL CALCULATED.3IONS-SCNC: 13 MMOL/L (ref 4–13)
BASOPHILS # BLD AUTO: 0.04 THOUSANDS/ΜL (ref 0–0.1)
BASOPHILS NFR BLD AUTO: 0 % (ref 0–1)
BUN SERPL-MCNC: 62 MG/DL (ref 5–25)
CALCIUM SERPL-MCNC: 9 MG/DL (ref 8.3–10.1)
CHLORIDE SERPL-SCNC: 105 MMOL/L (ref 100–108)
CO2 SERPL-SCNC: 22 MMOL/L (ref 21–32)
CREAT SERPL-MCNC: 7.09 MG/DL (ref 0.6–1.3)
DSDNA AB SER-ACNC: <1 IU/ML (ref 0–9)
EOSINOPHIL # BLD AUTO: 0.22 THOUSAND/ΜL (ref 0–0.61)
EOSINOPHIL NFR BLD AUTO: 2 % (ref 0–6)
ERYTHROCYTE [DISTWIDTH] IN BLOOD BY AUTOMATED COUNT: 13.6 % (ref 11.6–15.1)
GFR SERPL CREATININE-BSD FRML MDRD: 7 ML/MIN/1.73SQ M
GLUCOSE SERPL-MCNC: 132 MG/DL (ref 65–140)
GLUCOSE SERPL-MCNC: 137 MG/DL (ref 65–140)
GLUCOSE SERPL-MCNC: 208 MG/DL (ref 65–140)
GLUCOSE SERPL-MCNC: 210 MG/DL (ref 65–140)
GLUCOSE SERPL-MCNC: 245 MG/DL (ref 65–140)
HCT VFR BLD AUTO: 31.1 % (ref 34.8–46.1)
HGB BLD-MCNC: 10 G/DL (ref 11.5–15.4)
KAPPA LC FREE SER-MCNC: 94.4 MG/L (ref 3.3–19.4)
KAPPA LC FREE/LAMBDA FREE SER: 1 {RATIO} (ref 0.26–1.65)
LAMBDA LC FREE SERPL-MCNC: 94.1 MG/L (ref 5.7–26.3)
LYMPHOCYTES # BLD AUTO: 3.55 THOUSANDS/ΜL (ref 0.6–4.47)
LYMPHOCYTES NFR BLD AUTO: 34 % (ref 14–44)
MAGNESIUM SERPL-MCNC: 1.8 MG/DL (ref 1.6–2.6)
MCH RBC QN AUTO: 29.5 PG (ref 26.8–34.3)
MCHC RBC AUTO-ENTMCNC: 32.2 G/DL (ref 31.4–37.4)
MCV RBC AUTO: 92 FL (ref 82–98)
MONOCYTES # BLD AUTO: 0.63 THOUSAND/ΜL (ref 0.17–1.22)
MONOCYTES NFR BLD AUTO: 6 % (ref 4–12)
NEUTROPHILS # BLD AUTO: 5.88 THOUSANDS/ΜL (ref 1.85–7.62)
NEUTS SEG NFR BLD AUTO: 57 % (ref 43–75)
NRBC BLD AUTO-RTO: 0 /100 WBCS
PHOSPHATE SERPL-MCNC: 5.1 MG/DL (ref 2.7–4.5)
PLATELET # BLD AUTO: 217 THOUSANDS/UL (ref 149–390)
PMV BLD AUTO: 10.9 FL (ref 8.9–12.7)
POTASSIUM SERPL-SCNC: 3.7 MMOL/L (ref 3.5–5.3)
RBC # BLD AUTO: 3.39 MILLION/UL (ref 3.81–5.12)
SODIUM SERPL-SCNC: 140 MMOL/L (ref 136–145)
WBC # BLD AUTO: 10.36 THOUSAND/UL (ref 4.31–10.16)

## 2017-11-28 PROCEDURE — 85025 COMPLETE CBC W/AUTO DIFF WBC: CPT | Performed by: NURSE PRACTITIONER

## 2017-11-28 PROCEDURE — 80048 BASIC METABOLIC PNL TOTAL CA: CPT | Performed by: NURSE PRACTITIONER

## 2017-11-28 PROCEDURE — 83735 ASSAY OF MAGNESIUM: CPT | Performed by: NURSE PRACTITIONER

## 2017-11-28 PROCEDURE — 84100 ASSAY OF PHOSPHORUS: CPT | Performed by: NURSE PRACTITIONER

## 2017-11-28 PROCEDURE — 82948 REAGENT STRIP/BLOOD GLUCOSE: CPT

## 2017-11-28 RX ADMIN — CALCITRIOL 0.25 MCG: 0.25 CAPSULE ORAL at 08:55

## 2017-11-28 RX ADMIN — ASPIRIN 81 MG 81 MG: 81 TABLET ORAL at 08:56

## 2017-11-28 RX ADMIN — HEPARIN SODIUM 7500 UNITS: 5000 INJECTION, SOLUTION INTRAVENOUS; SUBCUTANEOUS at 13:12

## 2017-11-28 RX ADMIN — CARVEDILOL 12.5 MG: 12.5 TABLET, FILM COATED ORAL at 08:56

## 2017-11-28 RX ADMIN — ATORVASTATIN CALCIUM 80 MG: 40 TABLET, FILM COATED ORAL at 16:04

## 2017-11-28 RX ADMIN — CARVEDILOL 12.5 MG: 12.5 TABLET, FILM COATED ORAL at 16:04

## 2017-11-28 RX ADMIN — CHLORHEXIDINE GLUCONATE 15 ML: 1.2 RINSE ORAL at 08:56

## 2017-11-28 RX ADMIN — CIPROFLOXACIN HYDROCHLORIDE 250 MG: 250 TABLET, FILM COATED ORAL at 16:04

## 2017-11-28 RX ADMIN — CHLORHEXIDINE GLUCONATE 15 ML: 1.2 RINSE ORAL at 21:22

## 2017-11-28 RX ADMIN — INSULIN LISPRO 2 UNITS: 100 INJECTION, SOLUTION INTRAVENOUS; SUBCUTANEOUS at 16:03

## 2017-11-28 RX ADMIN — INSULIN DETEMIR 10 UNITS: 100 INJECTION, SOLUTION SUBCUTANEOUS at 21:24

## 2017-11-28 RX ADMIN — HEPARIN SODIUM 7500 UNITS: 5000 INJECTION, SOLUTION INTRAVENOUS; SUBCUTANEOUS at 21:23

## 2017-11-28 RX ADMIN — HEPARIN SODIUM 7500 UNITS: 5000 INJECTION, SOLUTION INTRAVENOUS; SUBCUTANEOUS at 05:49

## 2017-11-28 RX ADMIN — INSULIN LISPRO 3 UNITS: 100 INJECTION, SOLUTION INTRAVENOUS; SUBCUTANEOUS at 11:25

## 2017-11-28 RX ADMIN — AMLODIPINE BESYLATE 10 MG: 10 TABLET ORAL at 08:56

## 2017-11-28 RX ADMIN — INSULIN LISPRO 1 UNITS: 100 INJECTION, SOLUTION INTRAVENOUS; SUBCUTANEOUS at 21:23

## 2017-11-28 NOTE — SOCIAL WORK
CM met with patient at bedside  Patient had trouble with memory and answering questions asked for this assessment  Patient states it was okay for this CM to phone her -Sebastián  MIKE phoned Christelle Caldwell states they live in a three level house  Aurelio Cheng states that there are 12 steps down to the basement where they reside  Prior to hospitalization patient did not use DME at home  Patient does not have Rehab Hx and HHC Hx  Patient fills her Rx at [address]  [place]  Patient states there are no mental health Hx Dx and no Substance abuse Dx Hx  Aurelio Cheng states he is DPOA for patient  Patient is not employed  Patient did drive prior to hospitalization  Aurelio Cheng states he is open to patient going to STR  CM will follow patient's needs

## 2017-11-28 NOTE — PROGRESS NOTES
[facility] Progress Note  Patient: Alfrieda Phalen 59 y o  female   MRN: [id number]  PCP: Elizabeth Earl PA-C  Unit/Bed#: [facility]-01 Encounter: [id number]  Date Of Visit: 11/28/17    Assessment:    Principal Problem:    Acute on chronic kidney failure ([place] [address] )  Active Problems:    Hyperkalemia    Essential hypertension    HLD (hyperlipidemia)    Type 1 diabetes mellitus ([place] [address] )    UTI (urinary tract infection)    Stroke (cerebrum) (HCC)    Proteinuria    Secondary hyperparathyroidism of renal origin ([place] [address] )      Plan:    # Acute CVA w/ petechial hemorrhage  - Neurology on board  - s/p MRI  - S/p VAS carotid with high grade L carotid stenosis    # L carotid stenosis  - Appreciate vascular input, would plan on surgical intervention however pt still reluctant to have HD  - Spoke at length with patient and she is will to consider and will let us know by tomorrow  - on asa and high dose statin, per vascular recommends plavix once ok with neurology given petechial hemorrhage    # DALLAS on CKD  - Nephrology on board  - Approaching needing HD, and was discussed with pt at length  Still refusing but at least will reconsider and will make up her mind tomorrow    # IDDM - cont insulin regimen    # HTN - Stable    # Pyuria - s/p urine cx < 100K and mixed contaminant - d/c ciiprofloxacin    VTE Pharmacologic Prophylaxis:   Pharmacologic: Heparin  Mechanical VTE Prophylaxis in Place: No    Patient Centered Rounds: I have performed bedside rounds with nursing staff today  Discussions with Specialists or Other Care Team Provider:     Education and Discussions with Family / Patient: Patient    Time Spent for Care: 45 minutes  More than 50% of total time spent on counseling and coordination of care as described above      Current Length of Stay: 3 day(s)    Current Patient Status: Inpatient   Certification Statement: The patient will continue to require additional inpatient hospital stay due to Consideration for possible surgical intervention    Discharge Plan / Estimated Discharge Date:     Code Status: Level 1 - Full Code      Subjective:   Sitting in chair, no complaints  Cont to refuse HD but spoke at length w/ her and she is willing to reconsider  Objective:     Vitals:   Temp (24hrs), Av 4 °F (36 9 °C), Min:98 2 °F (36 8 °C), Max:98 6 °F (37 °C)    HR:  [59-97] 65  Resp:  [18-20] 18  BP: (120-183)/(63-86) 120/63  SpO2:  [96 %-100 %] 96 %  Body mass index is 36 93 kg/m²  Input and Output Summary (last 24 hours): Intake/Output Summary (Last 24 hours) at 17 1651  Last data filed at 17 1900   Gross per 24 hour   Intake              420 ml   Output                0 ml   Net              420 ml       Physical Exam:     Physical Exam   Constitutional: She is oriented to person, place, and time  She appears well-developed and well-nourished  Neck: Neck supple  Cardiovascular: Normal rate, regular rhythm, normal heart sounds and intact distal pulses  Exam reveals no gallop and no friction rub  No murmur heard  Pulmonary/Chest: Effort normal and breath sounds normal  No respiratory distress  She has no wheezes  She has no rales  She exhibits no tenderness  Abdominal: Soft  Bowel sounds are normal  She exhibits no distension and no mass  There is no tenderness  There is no rebound and no guarding  Musculoskeletal: Normal range of motion  She exhibits no edema, tenderness or deformity  Neurological: She is alert and oriented to person, place, and time  She has normal reflexes  RUE weakness  RT visual field deficit   Skin: Skin is warm and dry  Psychiatric: She has a normal mood and affect   Her behavior is normal  Judgment and thought content normal            Additional Data:     Labs:      Results from last 7 days  Lab Units 17  0544   WBC Thousand/uL 10 36*   HEMOGLOBIN g/dL 10 0*   HEMATOCRIT % 31 1*   PLATELETS Thousands/uL 217   NEUTROS PCT % 57   LYMPHS PCT % 34   MONOS PCT % 6   EOS PCT % 2       Results from last 7 days  Lab Units 11/28/17  0544  11/26/17  1101  11/25/17  1245   SODIUM mmol/L 140  < >  --   < > 142   POTASSIUM mmol/L 3 7  < >  --   < > 6 4*   CHLORIDE mmol/L 105  < >  --   < > 107   CO2 mmol/L 22  < >  --   < > 23   BUN mg/dL 62*  < >  --   < > 58*   CREATININE mg/dL 7 09*  < >  --   < > 6 35*   CALCIUM mg/dL 9 0  < >  --   < > 9 4   TOTAL PROTEIN g/dL  --   --  7 2  --  8 0   BILIRUBIN TOTAL mg/dL  --   --   --   --  0 40   ALK PHOS U/L  --   --   --   --  172*   ALT U/L  --   --   --   --  17   AST U/L  --   --   --   --  29   GLUCOSE RANDOM mg/dL 137  < >  --   < > 245*   < > = values in this interval not displayed  Results from last 7 days  Lab Units 11/25/17  1218   INR  1 00       * I Have Reviewed All Lab Data Listed Above  * Additional Pertinent Lab Tests Reviewed: All Labs Within Last 24 Hours Reviewed    Imaging:    Imaging Reports Reviewed Today Include:   Imaging Personally Reviewed by Myself Includes:      Recent Cultures (last 7 days):       Results from last 7 days  Lab Units 11/25/17  1415   URINE CULTURE  50,000-59,000 cfu/ml        Last 24 Hours Medication List:     amLODIPine 10 mg Oral Daily   aspirin 81 mg Oral Daily   atorvastatin 80 mg Oral Daily With Dinner   calcitriol 0 25 mcg Oral Daily   carvedilol 12 5 mg Oral BID With Meals   chlorhexidine 15 mL Swish & Spit Q12H [address]   ciprofloxacin 250 mg Oral Q18H   heparin (porcine) 7,500 Units Subcutaneous Q8H [address]   insulin detemir 10 Units Subcutaneous HS   insulin lispro 1-5 Units Subcutaneous HS   insulin lispro 1-6 Units Subcutaneous TID AC        Today, Patient Was Seen By: Sergey Coto DO    ** Please Note: This note has been constructed using a voice recognition system   **

## 2017-11-28 NOTE — CASE MANAGEMENT
[address] [place] [address] in the [facility] by Bobby Forte for 2017  Network Utilization Review Department  Phone: [phone]; Fax [phone]  ATTENTION: The Network Utilization Review Department is now centralized for our 7 Facilities  Make a note that we have a new phone and fax numbers for our Department  Please call with any questions or concerns to [phone] and carefully follow the prompts so that you are directed to the right person  All voicemails are confidential  Fax any determinations, approvals, denials, and requests for initial or continue stay review clinical to [phone]  Due to HIGH CALL volume, it would be easier if you could please send faxed requests to expedite your requests and in part, help us provide discharge notifications faster    Continued Stay Review    Date: 11/28    Vital Signs: BP (!) 172/84   Pulse 63   Temp 98 6 °F (37 °C) (Oral)   Resp 18   Ht 6' (1 829 m)   Wt 124 kg (272 lb 4 3 oz)   SpO2 100%   BMI 36 93 kg/m²     Medications:   Scheduled Meds:   amLODIPine 10 mg Oral Daily   aspirin 81 mg Oral Daily   atorvastatin 80 mg Oral Daily With Dinner   calcitriol 0 25 mcg Oral Daily   carvedilol 12 5 mg Oral BID With Meals   chlorhexidine 15 mL Swish & Spit Q12H [address]   ciprofloxacin 250 mg Oral Q18H   heparin (porcine) 7,500 Units Subcutaneous Q8H [address]   insulin detemir 10 Units Subcutaneous HS   insulin lispro 1-5 Units Subcutaneous HS   insulin lispro 1-6 Units Subcutaneous TID AC     Continuous Infusions:    PRN Meds: influenza vaccine    pneumococcal 13-valent conjugate vaccine    Abnormal Labs/Diagnostic Results: Results from last 7 days  Lab Units 11/28/17  0544 11/27/17  0606 11/26/17  1101 11/26/17  0542 11/25/17  1900 11/25/17  1245 11/25/17  1218   WBC Thousand/uL 10 36* 11 09*  --  10 89*  --   --  9 92   HEMOGLOBIN g/dL 10 0* 10 5*  --  10 9*  --   --  10 8*   HEMATOCRIT % 31 1* 31 8*  --  33 2*  --   --  33 7* PLATELETS Thousands/uL 217 259  --  251 227  --  259   SODIUM mmol/L 140 140  --  141  --  142  --    POTASSIUM mmol/L 3 7 3 7  --  4 0  --  6 4*  --    CHLORIDE mmol/L 105 104  --  108  --  107  --    CO2 mmol/L 22 23  --  23  --  23  --    BUN mg/dL 62* 55*  --  50*  --  58*  --    CREATININE mg/dL 7 09* 6 64*  --  6 22*  --  6 35*  --    CALCIUM mg/dL 9 0 9 2  --  9 4  --  9 4  --    MAGNESIUM mg/dL 1 8 1 8  --  1 7  --   --   --    PHOSPHORUS mg/dL 5 1* 4 6*  --  4 1  --   --   --    ALBUMIN g/dL  --   --   --  3 1*  --  3 5  --    TOTAL PROTEIN g/dL  --   --  7 2  --   --  8 0  --    GLUCOSE RANDOM mg/dL 137 163*  --  150*  --  245*  --          Age/Sex: 59 y o  female     Assessment/Plan: Assessment:     Principal Problem:    Acute on chronic kidney failure (HCC)  Active Problems:    Hyperkalemia    Essential hypertension    HLD (hyperlipidemia)    Type 1 diabetes mellitus (HCC)    UTI (urinary tract infection)    Stroke (cerebrum) (HCC)    Proteinuria   Plan:   · 1  Right hand weakness- mostlikley secondary to CVA- CT head shows left parietal stroke  Will f/u MRI brain  Neurology following  On aspirin and statin  · 2  Acute on chronic kidney injury- patient Cr  6 64  Nephrology recommending dialysis but patient refuses  Will continue to followup with nephrology recommendations  · 3  Hyperkalemia- improved after recieving kayexalate  · 4  DM-on levemir and ISS  · 5  HTN- on norvasc, carvedilol and hydralazine  · 6  UTI- on ciprofoxacin   VTE Pharmacologic Prophylaxis:   Pharmacologic: Heparin  Mechanical VTE Prophylaxis in Place: Yes   Patient Centered Rounds: I have performed bedside rounds with nursing staff today    Discussions with Specialists or Other Care Team Provider:    Education and Discussions with Family / Patient:    Time Spent for Care: 20 minutes    More than 50% of total time spent on counseling and coordination of care as described above    Current Length of Stay: 2 day(s)   Current Patient Status: Inpatient   Certification Statement: The patient will continue to require additional inpatient hospital stay due to [place], CVA   Discharge Plan / Estimated Discharge Date: Once CVA and kidney function improves  Nephrology progress note 11/28  CKD stage 5:  Dialysis discussed at length with her again she still refusing but going to think about it   Carotid artery stenosis:  Need surgery as per vascular surgeon:  Again that was discussed with the patient to    Neuro progress note 11/28  Assessment:  1  Severe carotid stenosis with multiple infarcts in the left anterior circulation associated with right upper extremity weakness, elements of receptive aphasia and right visual field deficit  2  Hypertension  3  Diabetes  4  Acute on chronic kidney disease   Plan:  Agree with vascular intervention  Stroke pathway    vascular consult  11/28   Assessment:  #1 symptomatic left carotid artery stenosis with cerebral infarction less than 8 weeks  #2 right sided hemiparesis secondary to left hemispheric CVA  #3 acute on chronic renal insufficiency   Plan:  Upon review of the carotid duplex performed yesterday, there is significantly elevated velocities in the left carotid bifurcation suggesting about 70-99% stenosis of the left proximal internal carotid artery  I believe this is the etiology of her recurrent CVAs  She is at extremely high risk of recurrent CVA if this lesion is not treated  Options include carotid endarterectomy versus stenting  I strongly recommended that we perform a CT angiogram of the head and neck to evaluate the anatomy And then proceed with carotid intervention which could be either an endarterectomy or stent  Unfortunately her renal function is severely compromised  CT scan with contrast dye is high risk for dialysis  Any kind of surgical or endovascular intervention carries and extreme high risk of Worsening of renal insufficiency and going into dialysis   Patient is adamant at this point that she does not want dialysis  I explained to patient that in case she gets recurrent strokes she might end up with permanent disability  I also called her  and discussed this on phone    In the meantime we should have her on optimal medical therapy which includes dual antiplatelets with aspirin and Plavix and a high-dose statin

## 2017-11-28 NOTE — PROGRESS NOTES
Progress Note - Neurology   Pamela Dash 59 y o  female MRN: [id number]  Unit/Bed#: -01 Encounter: [id number]      Subjective:   Iris Castro denies any headache  She continues no weakness in the right arm but has not noticed weakness in the leg or face  She has not noticed any problems with speech or swallowing  ROS: 12 system cued query was unchanged from org  consult note  Vitals:   Vitals:    11/28/17 0700   BP: (!) 183/86   Pulse: 59   Resp: 18   Temp: 98 5 °F (36 9 °C)   SpO2: 98%   ,Body mass index is 36 93 kg/m²  MEDS:    amLODIPine 10 mg Oral Daily   aspirin 81 mg Oral Daily   atorvastatin 80 mg Oral Daily With Dinner   calcitriol 0 25 mcg Oral Daily   carvedilol 12 5 mg Oral BID With Meals   chlorhexidine 15 mL Swish & Spit Q12H [address]   ciprofloxacin 250 mg Oral Q18H   heparin (porcine) 7,500 Units Subcutaneous Q8H [address]   insulin detemir 10 Units Subcutaneous HS   insulin lispro 1-5 Units Subcutaneous HS   insulin lispro 1-6 Units Subcutaneous TID AC   :    Physical Exam:  General appearance: alert, appears stated age and cooperative  HEENT/NECK: Head is atraumatic normocephalic, neck is supple  NEUROLOGIC:  Mental Status: Awake and alert  She has difficulty following multistep commands  She has finger agnosia  She was having difficulty reading sentences  No evidence of expressive aphasia  Cranial Nerves: Extraocular movements are full  A right visual field deficit is noted  Face reveals minimal left facial asymmetry  Motor:  A left upper extremity drift is noted on arm extension with weakness in the distal greater than proximal right upper extremity  Sensory:  She extinguishes the left side to double simultaneous stimulation  Coordination:  Finger-to-nose testing is performed more accurately on the left than right  Lab Results: I have personally reviewed pertinent reports  Imaging Studies: I have personally reviewed pertinent films in PACS    Multiple CVAs are noted in the left hemisphere predominantly in the anterior circulation distribution  Carotid ultrasound demonstrates high-grade stenosis of the left carotid artery      Assessment:  1  Severe carotid stenosis with multiple infarcts in the left anterior circulation associated with right upper extremity weakness, elements of receptive aphasia and right visual field deficit  2  Hypertension  3  Diabetes  4  Acute on chronic kidney disease    Plan:  Agree with vascular intervention  Stroke pathway      Heri Flores MD  11/28/2017,10:46 AM    Dictation voice to text software has been used in the creation of this document

## 2017-11-28 NOTE — PLAN OF CARE
DISCHARGE PLANNING     Discharge to home or other facility with appropriate resources Progressing        INFECTION - ADULT     Absence or prevention of progression during hospitalization Progressing        Knowledge Deficit     Patient/family/caregiver demonstrates understanding of disease process, treatment plan, medications, and discharge instructions Progressing        METABOLIC, FLUID AND ELECTROLYTES - ADULT     Electrolytes maintained within normal limits Progressing     Fluid balance maintained Progressing     Glucose maintained within target range Progressing        NEUROSENSORY - ADULT     Achieves stable or improved neurological status Progressing     Achieves maximal functionality and self care Progressing        PAIN - ADULT     Verbalizes/displays adequate comfort level or baseline comfort level Progressing        Potential for Falls     Patient will remain free of falls Progressing        Prexisting or High Potential for Compromised Skin Integrity     Skin integrity is maintained or improved Progressing        SAFETY ADULT     Patient will remain free of falls Progressing     Maintain or return to baseline ADL function Progressing     Maintain or return mobility status to optimal level Progressing

## 2017-11-28 NOTE — PROGRESS NOTES
NEPHROLOGY PROGRESS NOTE    Patient: Mervat Elias               Sex: female          DOA: 11/25/2017 11:24 AM   YOB: 1960        Age:  59 y o         LOS:  LOS: 3 days       HPI     Patient id possible stage 5 CKD    SUBJECTIVE     Feeling well denies any complaint still refusing dialysis    CURRENT MEDICATIONS       Current Facility-Administered Medications:     amLODIPine (NORVASC) tablet 10 mg, 10 mg, Oral, Daily, KATIE Muhammad, 10 mg at 11/28/17 9395    aspirin chewable tablet 81 mg, 81 mg, Oral, Daily, Carlos Weir MD, 81 mg at 11/28/17 0856    atorvastatin (LIPITOR) tablet 80 mg, 80 mg, Oral, Daily With KATIE Brady, 80 mg at 11/27/17 1714    calcitriol (ROCALTROL) capsule 0 25 mcg, 0 25 mcg, Oral, Daily, Karan Adrian MD, 0 25 mcg at 11/28/17 0855    carvedilol (COREG) tablet 12 5 mg, 12 5 mg, Oral, BID With Meals, KATIE Muhammad, 12 5 mg at 11/28/17 0856    chlorhexidine (PERIDEX) 0 12 % oral rinse 15 mL, 15 mL, Swish & Spit, Q12H [facility], KATIE Muhammad, 15 mL at 11/28/17 0856    ciprofloxacin (CIPRO) tablet 250 mg, 250 mg, Oral, Q18H, KATIE Muhammad, 250 mg at 11/27/17 2332    heparin (porcine) subcutaneous injection 7,500 Units, 7,500 Units, Subcutaneous, Q8H [facility], 7,500 Units at 11/28/17 0549 **AND** Platelet count, , , Once, KATIE Muhammad    influenza inactivated quadrivalent vaccine (FLULAVAL) IM injection 0 5 mL, 0 5 mL, Intramuscular, Prior to discharge, Marichuy Nolasco MD    insulin detemir (LEVEMIR) subcutaneous injection 10 Units, 10 Units, Subcutaneous, HS, KATIE Muhammad, 10 Units at 11/27/17 2145    insulin lispro (HumaLOG) 100 units/mL subcutaneous injection 1-5 Units, 1-5 Units, Subcutaneous, HS, KATIE Muhammad, 2 Units at 11/27/17 4729    insulin lispro (HumaLOG) 100 units/mL subcutaneous injection 1-6 Units, 1-6 Units, Subcutaneous, TID AC, 3 Units at 11/28/17 1125 **AND** Fingerstick Glucose (POCT), , , TID AC, KATIE Hein    pneumococcal 13-valent conjugate vaccine (PREVNAR-13) IM injection 0 5 mL, 0 5 mL, Intramuscular, Prior to discharge, Holly Mart MD    OBJECTIVE     Current Weight: Weight - Scale: 124 kg (272 lb 4 3 oz)  Vitals:    11/28/17 1100   BP: (!) 172/84   Pulse: 63   Resp: 18   Temp: 98 6 °F (37 °C)   SpO2: 100%       Intake/Output Summary (Last 24 hours) at 11/28/17 1205  Last data filed at 11/27/17 1900   Gross per 24 hour   Intake              420 ml   Output                0 ml   Net              420 ml       PHYSICAL EXAMINATION     Physical Exam:   Eyes:  Pupils equally react to light  ENT:  Moist tongue  Gen:    Not in any acute distress  Neck:  Supple no JVD  Chest:  Clear  Cor:  S1-S2 normal  Abdomen:  Soft nontender  Ext:  No swelling  Neuro:  Awake and alert  Skin:  Good skin turgor      LAB RESULTS       Results from last 7 days  Lab Units 11/28/17  0544 11/27/17  0606 11/26/17  1101 11/26/17  0542 11/25/17  1900 11/25/17  1245 11/25/17  1218   WBC Thousand/uL 10 36* 11 09*  --  10 89*  --   --  9 92   HEMOGLOBIN g/dL 10 0* 10 5*  --  10 9*  --   --  10 8*   HEMATOCRIT % 31 1* 31 8*  --  33 2*  --   --  33 7*   PLATELETS Thousands/uL 217 259  --  251 227  --  259   SODIUM mmol/L 140 140  --  141  --  142  --    POTASSIUM mmol/L 3 7 3 7  --  4 0  --  6 4*  --    CHLORIDE mmol/L 105 104  --  108  --  107  --    CO2 mmol/L 22 23  --  23  --  23  --    BUN mg/dL 62* 55*  --  50*  --  58*  --    CREATININE mg/dL 7 09* 6 64*  --  6 22*  --  6 35*  --    CALCIUM mg/dL 9 0 9 2  --  9 4  --  9 4  --    MAGNESIUM mg/dL 1 8 1 8  --  1 7  --   --   --    PHOSPHORUS mg/dL 5 1* 4 6*  --  4 1  --   --   --    ALBUMIN g/dL  --   --   --  3 1*  --  3 5  --    TOTAL PROTEIN g/dL  --   --  7 2  --   --  8 0  --    GLUCOSE RANDOM mg/dL 137 163*  --  150*  --  245*  -- RADIOLOGY RESULTS      No results found for this or any previous visit  Results for orders placed during the hospital encounter of 11/25/17   XR chest 2 views    Narrative CHEST     INDICATION:  Confusion  Right hand weakness    COMPARISON:  None    VIEWS:  Frontal and lateral projections    IMAGES:  2    FINDINGS:         Cardiomediastinal silhouette appears unremarkable  Right diaphragm is elevated  Lungs are clear  No evidence of consolidation pulmonary edema pleural effusion    Visualized osseous structures appear within normal limits for the patient's age  Impression No active pulmonary disease  Workstation performed: [id number]       No results found for this or any previous visit  No results found for this or any previous visit  No results found for this or any previous visit  No results found for this or any previous visit  PLAN / RECOMMENDATIONS      CKD stage 5:  Dialysis discussed at length with her again she still refusing but going to think about it    Carotid artery stenosis:  Need surgery as per vascular surgeon:  Again that was discussed with the patient to    Will continue to follow    Colin Park MD  Nephrology  11/28/2017        Portions of the record may have been created with voice recognition software  Occasional wrong word or "sound a like" substitutions may have occurred due to the inherent limitations of voice recognition software  Read the chart carefully and recognize, using context, where substitutions have occurred

## 2017-11-28 NOTE — CONSULTS
Consult Note - Vascular Surgery   [facility]: [phone]    Assessment:  #1 symptomatic left carotid artery stenosis with cerebral infarction less than 8 weeks  #2 right sided hemiparesis secondary to left hemispheric CVA  #3 acute on chronic renal insufficiency    Plan:  Upon review of the carotid duplex performed yesterday, there is significantly elevated velocities in the left carotid bifurcation suggesting about 70-99% stenosis of the left proximal internal carotid artery  I believe this is the etiology of her recurrent CVAs  She is at extremely high risk of recurrent CVA if this lesion is not treated  Options include carotid endarterectomy versus stenting  I strongly recommended that we perform a CT angiogram of the head and neck to evaluate the anatomy And then proceed with carotid intervention which could be either an endarterectomy or stent  Unfortunately her renal function is severely compromised  CT scan with contrast dye is high risk for dialysis  Any kind of surgical or endovascular intervention carries and extreme high risk of Worsening of renal insufficiency and going into dialysis  Patient is adamant at this point that she does not want dialysis  I explained to patient that in case she gets recurrent strokes she might end up with permanent disability  I also called her  and discussed this on phone  In the meantime we should have her on optimal medical therapy which includes dual antiplatelets with aspirin and Plavix and a high-dose statin  Plavix should be started only once cleared by neurologist as she has some mild petechial hemorrhages in the area of infarcts seen on MRI  Based on this I would wait for at least 2-3 weeks prior to any carotid intervention to avoid cerebral hyperperfusion syndrome  I also discussed with her nephrologist Dr Adan Barkley on the phone  Patient has been adamantly refusing dialysis for the past 3 days   Recommend that we save the left arm for possible future dialysis access  Please place limb alert band on left arm to avoid any blood draws, venipunctures, IV sticks, IV access or blood pressure measurements   ______________________________________________________________________    Consulting Service: SL[facility]    Chief Complaint: My right hand is weak    HPI: Nilson Pinto is a 59 y o  female who presents with Right upper extremity weakness  This has been going on for past 1 week  She is right-handed  She denies any episodes like this in the past  Initially her weakness was very significant she cannot lift any objects but now is slowly improving  Workup was performed in the hospital which demonstrated a left parietal stroke, high-grade left carotid artery stenosis  She also has worsening renal failure  She has had long-standing kidney disease but has recently worsened  She was told that she might need dialysis but she does not want any dialysis  Review of Systems:  General: negative  Cardiovascular: no chest pain or dyspnea on exertion  Respiratory: no cough, shortness of breath, or wheezing  Gastrointestinal: no abdominal pain, change in bowel habits, or black or bloody stools  Genitourinary ROS: no dysuria, trouble voiding, or hematuria  Musculoskeletal ROS: positive for - muscular weakness Of right hand  Neurological ROS: positive for - Weakness of the right hand  Hematological and Lymphatic ROS: negative  Dermatological ROS: negative  Psychological ROS: negative      Past Medical History:  Past Medical History:   Diagnosis Date    Atrial fibrillation ([facility] )     CKD (chronic kidney disease)     Diabetes mellitus ([facility] )     type II    Hyperlipidemia     Hypertension     Stroke (cerebrum) ([facility] ) 11/25/2017       Past Surgical History:  History reviewed  No pertinent surgical history      Social History:  History   Alcohol Use No     History   Drug Use No     History   Smoking Status    Former Smoker   Smokeless Tobacco    Never Used       Family History:  Family History   Problem Relation Age of Onset    Kidney failure Sister        Allergies:  No Known Allergies    Medications:  Current Facility-Administered Medications   Medication Dose Route Frequency    amLODIPine (NORVASC) tablet 10 mg  10 mg Oral Daily    aspirin chewable tablet 81 mg  81 mg Oral Daily    atorvastatin (LIPITOR) tablet 80 mg  80 mg Oral Daily With Dinner    calcitriol (ROCALTROL) capsule 0 25 mcg  0 25 mcg Oral Daily    carvedilol (COREG) tablet 12 5 mg  12 5 mg Oral BID With Meals    chlorhexidine (PERIDEX) 0 12 % oral rinse 15 mL  15 mL Swish & Spit Q12H [address]    ciprofloxacin (CIPRO) tablet 250 mg  250 mg Oral Q18H    heparin (porcine) subcutaneous injection 7,500 Units  7,500 Units Subcutaneous Q8H [address]    influenza inactivated quadrivalent vaccine (FLULAVAL) IM injection 0 5 mL  0 5 mL Intramuscular Prior to discharge    insulin detemir (LEVEMIR) subcutaneous injection 10 Units  10 Units Subcutaneous HS    insulin lispro (HumaLOG) 100 units/mL subcutaneous injection 1-5 Units  1-5 Units Subcutaneous HS    insulin lispro (HumaLOG) 100 units/mL subcutaneous injection 1-6 Units  1-6 Units Subcutaneous TID AC    pneumococcal 13-valent conjugate vaccine (PREVNAR-13) IM injection 0 5 mL  0 5 mL Intramuscular Prior to discharge       Vitals:  BP     Temp      Pulse     Resp      SpO2        I/Os:  I/O last 3 completed shifts: In: 740 [P O :740]  Out: -   No intake/output data recorded      Lab Results and Cultures:   CBC with diff:   Lab Results   Component Value Date    WBC 10 36 (H) 11/28/2017    HGB 10 0 (L) 11/28/2017    HCT 31 1 (L) 11/28/2017    MCV 92 11/28/2017     11/28/2017    MCH 29 5 11/28/2017    MCHC 32 2 11/28/2017    RDW 13 6 11/28/2017    MPV 10 9 11/28/2017    NRBC 0 11/28/2017   ,   BMP/CMP:  Lab Results   Component Value Date     11/28/2017    K 3 7 11/28/2017     11/28/2017    CO2 22 11/28/2017    ANIONGAP 13 11/28/2017    BUN 62 (H) 11/28/2017    CREATININE 7 09 (H) 11/28/2017    GLUCOSE 137 11/28/2017    CALCIUM 9 0 11/28/2017    AST 29 11/25/2017    ALT 17 11/25/2017    ALKPHOS 172 (H) 11/25/2017    PROT 7 2 11/26/2017    PROT 8 0 11/25/2017    ALBUMIN 3 1 (L) 11/26/2017    BILITOT 0 40 11/25/2017    EGFR 7 11/28/2017   ,   Lipid Panel:   Lab Results   Component Value Date    CHOL 276 (H) 11/25/2017   ,   Coags:   Lab Results   Component Value Date    PTT 28 11/25/2017    INR 1 00 11/25/2017   ,     Blood Culture: No results found for: BLOODCX,   Urinalysis:   Lab Results   Component Value Date    COLORU yellow 11/25/2017    COLORU Colorless 11/25/2017    CLARITYU clear 11/25/2017    CLARITYU Slightly Cloudy 11/25/2017    SPECGRAV 1 015 11/25/2017    PHUR 7 0 11/25/2017    LEUKOCYTESUR Moderate (A) 11/25/2017    NITRITE Negative 11/25/2017    PROTEINUA >=300 (A) 11/25/2017    GLUCOSEU 250 (1/4%) (A) 11/25/2017    KETONESU Negative 11/25/2017    BILIRUBINUR Negative 11/25/2017    BLOODU Moderate (A) 11/25/2017   ,   Urine Culture:   Lab Results   Component Value Date    URINECX 50,000-59,000 cfu/ml  11/25/2017   ,   Wound Culure: No results found for: WOUNDCULT    Imaging:  Carotid duplex reviewed there is high-grade stenosis of the left internal carotid artery with significantly elevated velocities consistent most likely with more than 90% stenosis    Physical Exam:    General appearance: alert, appears stated age and cooperative  Head: Normocephalic, without obvious abnormality, atraumatic  Throat: lips, mucosa, and tongue normal; teeth and gums normal  Neck: no adenopathy, There is a left carotid bruit auscultated, no JVD, supple, symmetrical, trachea midline and thyroid not enlarged, symmetric, no tenderness/mass/nodules  Back: Symmetric  Lungs: clear to auscultation bilaterally  Chest wall: no tenderness  Heart: regular rate and rhythm, S1, S2 normal, no murmur, click, rub or gallop  Abdomen: Soft nontender  Extremities: extremities normal, atraumatic, no cyanosis or edema  Skin: Skin color, texture, turgor normal  No rashes or lesions  Neurologic: Clumsiness of the right hand with 4 out of 5 strength of the right hand  strength  Right lower extremity 5 out of 5  Left upper and lower extremity 5 out of 5 strength          Pulse exam:  Radial: Right: 2+ Left[de-identified] 2+      Lyle Monroe MD  11/28/2017

## 2017-11-28 NOTE — PLAN OF CARE
Problem: DISCHARGE PLANNING - CARE MANAGEMENT  Goal: Discharge to post-acute care or home with appropriate resources  INTERVENTIONS:  - Conduct assessment to determine patient/family and health care team treatment goals, and need for post-acute services based on payer coverage, community resources, and patient preferences, and barriers to discharge  - Address psychosocial, clinical, and financial barriers to discharge as identified in assessment in conjunction with the patient/family and health care team  - Arrange appropriate level of post-acute services according to patient's   needs and preference and payer coverage in collaboration with the physician and health care team  - Communicate with and update the patient/family, physician, and health care team regarding progress on the discharge plan  - Arrange appropriate transportation to post-acute venues  Outcome: Progressing  CM met with patient at bedside  Patient had trouble with memory and answering questions asked for this assessment  Patient states it was okay for this CM to phone her -Sebastián  MIKE phoned Edilmaabeba Dos Santos states they live in a three level house  Nikolay Lopez states that there are 12 steps down to the basement where they reside  Prior to hospitalization patient did not use DME at home  Patient does not have Rehab Hx and HHC Hx  Patient fills her Rx at [facility]  [place]  Patient states there are no mental health Hx Dx and no Substance abuse Dx Hx  Nikolay Lopez states he is DPOA for patient  Patient is not employed  Patient did drive prior to hospitalization  Nikolay Lopez states he is open to patient going to STR  CM will follow patient's needs

## 2017-11-29 VITALS
HEIGHT: 72 IN | TEMPERATURE: 97.8 F | HEART RATE: 63 BPM | RESPIRATION RATE: 18 BRPM | OXYGEN SATURATION: 98 % | DIASTOLIC BLOOD PRESSURE: 60 MMHG | BODY MASS INDEX: 36.58 KG/M2 | SYSTOLIC BLOOD PRESSURE: 130 MMHG | WEIGHT: 270.06 LBS

## 2017-11-29 PROBLEM — N18.6 END STAGE KIDNEY DISEASE (HCC): Status: ACTIVE | Noted: 2017-11-29

## 2017-11-29 PROBLEM — E87.5 HYPERKALEMIA: Status: RESOLVED | Noted: 2017-11-25 | Resolved: 2017-11-29

## 2017-11-29 LAB
ALBUMIN UR ELPH-MCNC: 69.8 %
ALPHA1 GLOB MFR UR ELPH: 5.1 %
ALPHA2 GLOB MFR UR ELPH: 6.5 %
ANION GAP SERPL CALCULATED.3IONS-SCNC: 15 MMOL/L (ref 4–13)
B-GLOBULIN MFR UR ELPH: 7.9 %
BUN SERPL-MCNC: 73 MG/DL (ref 5–25)
CALCIUM SERPL-MCNC: 9.1 MG/DL (ref 8.3–10.1)
CHLORIDE SERPL-SCNC: 106 MMOL/L (ref 100–108)
CO2 SERPL-SCNC: 20 MMOL/L (ref 21–32)
CREAT SERPL-MCNC: 7.56 MG/DL (ref 0.6–1.3)
GAMMA GLOB MFR UR ELPH: 10.7 %
GFR SERPL CREATININE-BSD FRML MDRD: 6 ML/MIN/1.73SQ M
GLUCOSE SERPL-MCNC: 163 MG/DL (ref 65–140)
GLUCOSE SERPL-MCNC: 174 MG/DL (ref 65–140)
GLUCOSE SERPL-MCNC: 180 MG/DL (ref 65–140)
GLUCOSE SERPL-MCNC: 261 MG/DL (ref 65–140)
POTASSIUM SERPL-SCNC: 3.9 MMOL/L (ref 3.5–5.3)
PROT PATTERN UR ELPH-IMP: ABNORMAL
PROT UR-MCNC: 238 MG/DL
SODIUM SERPL-SCNC: 141 MMOL/L (ref 136–145)

## 2017-11-29 PROCEDURE — 82948 REAGENT STRIP/BLOOD GLUCOSE: CPT

## 2017-11-29 PROCEDURE — 97112 NEUROMUSCULAR REEDUCATION: CPT

## 2017-11-29 PROCEDURE — 97530 THERAPEUTIC ACTIVITIES: CPT

## 2017-11-29 PROCEDURE — 97116 GAIT TRAINING THERAPY: CPT

## 2017-11-29 PROCEDURE — 80048 BASIC METABOLIC PNL TOTAL CA: CPT | Performed by: INTERNAL MEDICINE

## 2017-11-29 PROCEDURE — 97110 THERAPEUTIC EXERCISES: CPT

## 2017-11-29 RX ORDER — CLOPIDOGREL BISULFATE 75 MG/1
75 TABLET ORAL DAILY
Status: DISCONTINUED | OUTPATIENT
Start: 2017-11-29 | End: 2017-11-29 | Stop reason: HOSPADM

## 2017-11-29 RX ORDER — AMLODIPINE BESYLATE 10 MG/1
10 TABLET ORAL DAILY
Qty: 30 TABLET | Refills: 1 | Status: SHIPPED | OUTPATIENT
Start: 2017-11-30 | End: 2018-01-27

## 2017-11-29 RX ORDER — CARVEDILOL 12.5 MG/1
12.5 TABLET ORAL 2 TIMES DAILY WITH MEALS
Qty: 60 TABLET | Refills: 1 | Status: SHIPPED | OUTPATIENT
Start: 2017-11-29 | End: 2018-01-27

## 2017-11-29 RX ORDER — ASPIRIN 81 MG/1
81 TABLET, CHEWABLE ORAL DAILY
Qty: 30 TABLET | Refills: 0
Start: 2017-11-30 | End: 2018-01-27

## 2017-11-29 RX ORDER — CLOPIDOGREL BISULFATE 75 MG/1
75 TABLET ORAL DAILY
Qty: 30 TABLET | Refills: 1 | Status: SHIPPED | OUTPATIENT
Start: 2017-11-30 | End: 2018-01-27

## 2017-11-29 RX ORDER — ATORVASTATIN CALCIUM 80 MG/1
80 TABLET, FILM COATED ORAL
Qty: 30 TABLET | Refills: 1 | Status: SHIPPED | OUTPATIENT
Start: 2017-11-29 | End: 2018-01-27

## 2017-11-29 RX ORDER — CALCITRIOL 0.25 UG/1
0.25 CAPSULE, LIQUID FILLED ORAL DAILY
Qty: 30 CAPSULE | Refills: 1 | Status: SHIPPED | OUTPATIENT
Start: 2017-11-30 | End: 2018-01-27

## 2017-11-29 RX ADMIN — INSULIN LISPRO 3 UNITS: 100 INJECTION, SOLUTION INTRAVENOUS; SUBCUTANEOUS at 11:18

## 2017-11-29 RX ADMIN — INSULIN LISPRO 1 UNITS: 100 INJECTION, SOLUTION INTRAVENOUS; SUBCUTANEOUS at 06:47

## 2017-11-29 RX ADMIN — CARVEDILOL 12.5 MG: 12.5 TABLET, FILM COATED ORAL at 16:08

## 2017-11-29 RX ADMIN — CHLORHEXIDINE GLUCONATE 15 ML: 1.2 RINSE ORAL at 09:34

## 2017-11-29 RX ADMIN — INSULIN LISPRO 1 UNITS: 100 INJECTION, SOLUTION INTRAVENOUS; SUBCUTANEOUS at 16:10

## 2017-11-29 RX ADMIN — ATORVASTATIN CALCIUM 80 MG: 40 TABLET, FILM COATED ORAL at 16:08

## 2017-11-29 RX ADMIN — CALCITRIOL 0.25 MCG: 0.25 CAPSULE ORAL at 09:34

## 2017-11-29 RX ADMIN — CARVEDILOL 12.5 MG: 12.5 TABLET, FILM COATED ORAL at 06:46

## 2017-11-29 RX ADMIN — ASPIRIN 81 MG 81 MG: 81 TABLET ORAL at 09:34

## 2017-11-29 RX ADMIN — HEPARIN SODIUM 7500 UNITS: 5000 INJECTION, SOLUTION INTRAVENOUS; SUBCUTANEOUS at 06:46

## 2017-11-29 RX ADMIN — AMLODIPINE BESYLATE 10 MG: 10 TABLET ORAL at 09:34

## 2017-11-29 RX ADMIN — HEPARIN SODIUM 7500 UNITS: 5000 INJECTION, SOLUTION INTRAVENOUS; SUBCUTANEOUS at 13:36

## 2017-11-29 RX ADMIN — CLOPIDOGREL 75 MG: 75 TABLET, FILM COATED ORAL at 13:32

## 2017-11-29 NOTE — PROGRESS NOTES
NEPHROLOGY PROGRESS NOTE    Patient: Angélica John               Sex: female          DOA: 11/25/2017 11:24 AM   YOB: 1960        Age:  59 y o         LOS:  LOS: 4 days       HPI     Patient with stage 5 CKD    SUBJECTIVE       She is feeling well no complaint  Still not sure about dialysis      CURRENT MEDICATIONS       Current Facility-Administered Medications:     amLODIPine (NORVASC) tablet 10 mg, 10 mg, Oral, Daily, KATIE Fernandes, 10 mg at 11/29/17 3993    aspirin chewable tablet 81 mg, 81 mg, Oral, Daily, Sheryl Kim MD, 81 mg at 11/29/17 0934    atorvastatin (LIPITOR) tablet 80 mg, 80 mg, Oral, Daily With KATIE Carter, 80 mg at 11/28/17 1604    calcitriol (ROCALTROL) capsule 0 25 mcg, 0 25 mcg, Oral, Daily, Jez Rivas MD, 0 25 mcg at 11/29/17 0934    carvedilol (COREG) tablet 12 5 mg, 12 5 mg, Oral, BID With Meals, KATIE Fernandes, 12 5 mg at 11/29/17 0646    chlorhexidine (PERIDEX) 0 12 % oral rinse 15 mL, 15 mL, Swish & Spit, Q12H [address], KATIE Kapoor, 15 mL at 11/29/17 7248    heparin (porcine) subcutaneous injection 7,500 Units, 7,500 Units, Subcutaneous, Q8H [address], 7,500 Units at 11/29/17 0646 **AND** Platelet count, , , Once, [place], CRNP    influenza inactivated quadrivalent vaccine (FLULAVAL) IM injection 0 5 mL, 0 5 mL, Intramuscular, Prior to discharge, Lupe Gowers, MD    insulin detemir (LEVEMIR) subcutaneous injection 10 Units, 10 Units, Subcutaneous, HS, KATIE Fernandes, 10 Units at 11/28/17 2124    insulin lispro (HumaLOG) 100 units/mL subcutaneous injection 1-5 Units, 1-5 Units, Subcutaneous, HS, KATIE Fernandes, 1 Units at 11/28/17 2123    insulin lispro (HumaLOG) 100 units/mL subcutaneous injection 1-6 Units, 1-6 Units, Subcutaneous, TID AC, 1 Units at 11/29/17 0647 **AND** Fingerstick Glucose (POCT), , , TID AC, [facility] NP    pneumococcal 13-valent conjugate vaccine (PREVNAR-13) IM injection 0 5 mL, 0 5 mL, Intramuscular, Prior to discharge, Anil Rios MD    OBJECTIVE     Current Weight: Weight - Scale: 123 kg (270 lb 1 oz)  Vitals:    11/29/17 0700   BP: 168/95   Pulse: 68   Resp: 18   Temp: 98 4 °F (36 9 °C)   SpO2: 99%       Intake/Output Summary (Last 24 hours) at 11/29/17 1100  Last data filed at 11/28/17 1900   Gross per 24 hour   Intake              480 ml   Output                0 ml   Net              480 ml       PHYSICAL EXAMINATION     Physical Exam:   Eyes:  Pupils equally react to light  ENT:  Moist tongue  Gen:  Not in any acute distress  Neck:  Supple  Chest:  Clear  Cor:  S1-S2 normal  Abdomen:  Soft nontender  Ext:  No swelling  Neuro:  Awake and alert  Skin:  Good skin turgor      LAB RESULTS       Results from last 7 days  Lab Units 11/29/17  0636 11/28/17  0544 11/27/17  0606 11/26/17  1101 11/26/17  0542 11/25/17  1900 11/25/17  1245 11/25/17  1218   WBC Thousand/uL  --  10 36* 11 09*  --  10 89*  --   --  9 92   HEMOGLOBIN g/dL  --  10 0* 10 5*  --  10 9*  --   --  10 8*   HEMATOCRIT %  --  31 1* 31 8*  --  33 2*  --   --  33 7*   PLATELETS Thousands/uL  --  217 259  --  251 227  --  259   SODIUM mmol/L 141 140 140  --  141  --  142  --    POTASSIUM mmol/L 3 9 3 7 3 7  --  4 0  --  6 4*  --    CHLORIDE mmol/L 106 105 104  --  108  --  107  --    CO2 mmol/L 20* 22 23  --  23  --  23  --    BUN mg/dL 73* 62* 55*  --  50*  --  58*  --    CREATININE mg/dL 7 56* 7 09* 6 64*  --  6 22*  --  6 35*  --    CALCIUM mg/dL 9 1 9 0 9 2  --  9 4  --  9 4  --    MAGNESIUM mg/dL  --  1 8 1 8  --  1 7  --   --   --    PHOSPHORUS mg/dL  --  5 1* 4 6*  --  4 1  --   --   --    ALBUMIN g/dL  --   --   --   --  3 1*  --  3 5  --    TOTAL PROTEIN g/dL  --   --   --  7 2  --   --  8 0  --    GLUCOSE RANDOM mg/dL 174* 137 163*  --  150*  --  245*  --        RADIOLOGY RESULTS      No results found for this or any previous visit  Results for orders placed during the hospital encounter of 11/25/17   XR chest 2 views    Narrative CHEST     INDICATION:  Confusion  Right hand weakness    COMPARISON:  None    VIEWS:  Frontal and lateral projections    IMAGES:  2    FINDINGS:         Cardiomediastinal silhouette appears unremarkable  Right diaphragm is elevated  Lungs are clear  No evidence of consolidation pulmonary edema pleural effusion    Visualized osseous structures appear within normal limits for the patient's age  Impression No active pulmonary disease  Workstation performed: [id number]       No results found for this or any previous visit  No results found for this or any previous visit  No results found for this or any previous visit  No results found for this or any previous visit  PLAN / RECOMMENDATIONS      CKD stage 5:  Dialysis discussed again with her and she still not show and refusing at this point    Carotid artery stenosis:  Need further workup but as patient is not sure about dialysis it cannot be done and patient is also not very sure she wants that to be done    Disposition:  I discussed with the patient at length  I think she will benefit from going to [facility] classes that will give her time about dialysis thinking  She will get more knowledge  And we can monitor as outpatient    Will discuss with primary team    Susan Mccann MD  Nephrology  11/29/2017        Portions of the record may have been created with voice recognition software  Occasional wrong word or "sound a like" substitutions may have occurred due to the inherent limitations of voice recognition software  Read the chart carefully and recognize, using context, where substitutions have occurred

## 2017-11-29 NOTE — SOCIAL WORK
CM met with patient at bedside  Patient states she wants to go home with [facility] services  Patient states her daughter-Nupur will transport her from the hospital at [facility] today  MIKE is waiting to find out which [facility] will accept patient

## 2017-11-29 NOTE — PLAN OF CARE
Problem: OCCUPATIONAL THERAPY ADULT  Goal: Performs self-care activities at highest level of function for planned discharge setting  See evaluation for individualized goals  Treatment Interventions: ADL retraining, Functional transfer training, Visual perceptual retraining, UE strengthening/ROM, Endurance training, Cognitive reorientation, Patient/family training, Equipment evaluation/education, Neuromuscular reeducation, Fine motor coordination activities, Compensatory technique education, Continued evaluation, Cardiac education, Energy conservation, Activityengagement          See flowsheet documentation for full assessment, interventions and recommendations  Outcome: Progressing  Limitation: Decreased ADL status, Decreased UE ROM, Decreased UE strength, Decreased Safe judgement during ADL, Decreased cognition, Decreased endurance, Decreased fine motor control, Decreased self-care trans, Decreased high-level ADLs  Prognosis: Good  Assessment: Patient participated in Skilled OT session this date with interventions consisting of ADL re training with the use of correct body mechnaics, Energy Conservation techniques, Work simplification skills , deep breathing technique, safety awareness and fall prevention techniques, therapeutic exercise to: increase functional use of BUEs, increase BUE muscle strength ,  therapeutic activities to: increase activity tolerance, increase standing tolerance time with unilateral UE support to complete sink level ADLs and increase dynamic sit/ stand balance during functional activity   Pt participated in ROM exercises 2x15 reps each plane  NMR/WB techniques to normalize movement RUE  Patient agreeable to OT treatment session, upon arrival patient was found seated OOB to Recliner  In comparison to previous session, patient with improvements in UB/LB dressing and bathing, functional ADL transfers and functional mobility   Pt required supervision sba for bathing, min A for LB dressing, and functional mobility mobility in room requiring cga w/o device  Pt tolerated standing up 4 min at sink  Patient requiring verbal cues for safety, verbal cues for correct technique and cognitive assistance to anticipate next step  Patient continues to be functioning below baseline level, occupational performance remains limited secondary to factors listed above and increased risk for falls and injury  From OT standpoint, recommendation at time of d/c would be Short Term Rehab  Patient to benefit from continued Occupational Therapy treatment while in the hospital to address deficits as defined above and maximize level of functional independence with ADLs and functional mobility    Recommendation: Physiatry Consult  OT Discharge Recommendation: Short Term Rehab (IP acute rehab )  OT - OK to Discharge: Yes (IP/Acute/ STR when medically cleared)

## 2017-11-29 NOTE — NURSING NOTE
Pt discharged via wheelchair through ED entrance for discharge home with family via private vehicle  Pt and family verbalized understanding of discharge instructions   All belongings sent with pt

## 2017-11-29 NOTE — DISCHARGE SUMMARY
[facility]    Patient Information: Adele Toney 59 y o  female MRN: [id number]  Unit/Bed#: -01 Encounter: [id number]    Discharging Physician / Practitioner: Theo Damon DO  PCP: Richard Sharpe PA-C  Admission Date: 11/25/2017  Discharge Date: 11/29/17    Reason for Admission: RUE weakness    Discharge Diagnoses:     Principal Problem:    Acute on chronic kidney failure [facility])  Active Problems:    Essential hypertension    HLD (hyperlipidemia)    Type 1 diabetes mellitus ([facility] )    Acute CVA (cerebrovascular accident) ([facility] )    Proteinuria    Secondary hyperparathyroidism of renal origin ([facility] )    End stage kidney disease ([place] [address] )  Resolved Problems:    Hyperkalemia      Consultations During Hospital Stay:  · Neurology  · Vascular surger  · Nephrology  · PMR    Procedures Performed:     · US Kidney and bladder  · VAS Lower limb duplex  · VAS carotid US  · MRI    Significant Findings:     · Acute CVA  · Endstage renal disease  · L carotid stenosis    Incidental Findings:   ·     Test Results Pending at Discharge (will require follow up):   ·      Outpatient Tests Requested:  · BMP In 1 week    Complications:  none    Hospital Course:     Adele Toney is a 59 y o  female with past medical history significant for diabetes on insulin, hyperlipidemia, and hypertension who presents with a one-week history of right upper extremity weakness and no associated symptoms  During her emergency room evaluation, it was discovered that she has acute kidney injury on CKD of unknown severity, and hyperkalemia  She is hemodynamically stable and her EKG does not manifest her hyperkalemia  CT brain revealed a subacute left parietal stroke in evolution  Patient states she last saw her PCP 4 months ago, but is unsure of her name  She is unable to tell me her normal glucose range or medications      # Acute CVA w/ petechial hemorrhage  - Neurology on board  - s/p MRI  - S/p VAS carotid with high grade L carotid stenosis  - Ok per neurology to start on plavix; in addition to asa, on statin      # L carotid stenosis  - Appreciate vascular input, would plan on surgical intervention however pt still reluctant to have HD  - Spoke at length with patient multiple times and she continues to refuse  Also spoke with her  and he states unfortunately he cannot force her   - on asa and high dose statin, per vascular recommends dual antiplt thus addition of plavix  Discussed with neurology Dr Rosa garibay to start         # [place] on CKD; end stage kidney  - Nephrology on board  - Approaching needing HD, and was discussed with pt at length but she continues to refuse  Further education and re-enforcement will be provided per nephrology  - started calcitrol  - Renal diet     # IDDM - cont insulin regimen     # HTN - medications adjusted accordingly     # Pyuria - s/p urine cx < 100K and mixed contaminant - thus  D/cd ciiprofloxacin    Disp: Plan of care discussed with patient and I also called her  and discussed plan of care at length  Condition at Discharge: stable     Discharge Day Visit / Exam:     Subjective:  Still continues to refuse HD  No worsening RUE weakness  Vitals: Blood Pressure: 142/66 (11/29/17 1100)  Pulse: 63 (11/29/17 1100)  Temperature: 97 5 °F (36 4 °C) (11/29/17 1100)  Temp Source: Oral (11/29/17 1100)  Respirations: 18 (11/29/17 1100)  Height: 6' (182 9 cm) (11/25/17 1730)  Weight - Scale: 123 kg (270 lb 1 oz) (11/29/17 0600)  SpO2: 100 % (11/29/17 1100)  Exam:   Physical Exam   Constitutional: She is oriented to person, place, and time  She appears well-developed  Neck: Neck supple  Cardiovascular: Normal rate, regular rhythm, normal heart sounds and intact distal pulses  Exam reveals no gallop and no friction rub  No murmur heard  Pulmonary/Chest: Effort normal and breath sounds normal  No respiratory distress  She has no wheezes  She has no rales   She exhibits no tenderness  Abdominal: Soft  Bowel sounds are normal  She exhibits no distension and no mass  There is no tenderness  There is no rebound and no guarding  Musculoskeletal: She exhibits no edema, tenderness or deformity  Neurological: She is alert and oriented to person, place, and time  She has normal reflexes  RUE weakness    Skin: Skin is warm and dry  No rash noted  No erythema  No pallor  Psychiatric: She has a normal mood and affect  Her behavior is normal  Judgment and thought content normal        Discharge instructions/Information to patient and family:   See after visit summary for information provided to patient and family  Provisions for Follow-Up Care:  See after visit summary for information related to follow-up care and any pertinent home health orders  Disposition:     Home with VNA Services (Reminder: Complete face to face encounter)    For Discharges to [facility] SNF:   · Not Applicable to this Patient - Not Applicable to this Patient    Planned Readmission: No     Discharge Statement:  I spent > 30 minutes discharging the patient  This time was spent on the day of discharge  I had direct contact with the patient on the day of discharge  Greater than 50% of the total time was spent examining patient, answering all patient questions, arranging and discussing plan of care with patient as well as directly providing post-discharge instructions  Additional time then spent on discharge activities  Discharge Medications:  See after visit summary for reconciled discharge medications provided to patient and family  ** Please Note: Dragon 360 Dictation voice to text software may have been used in the creation of this document  **        Addendum to discharge summary - Patient has no history of atrial fibrillation, nor was she ever on anticoagulation therapy    Presenting stroke secondary to severe L carotid stenosis for which patient did not want to pursue surgical intervention as would required hemodialysis    CVA prophylaxis with ASA/plavix and statin

## 2017-11-29 NOTE — PHYSICAL THERAPY NOTE
Physical Therapy Treatment Note       11/29/17 1415   Pain Assessment   Pain Assessment No/denies pain   Pain Score No Pain   Restrictions/Precautions   Weight Bearing Precautions Per Order No   Braces or Orthoses (none per pt)   Other Precautions Telemetry; Fall Risk;Cognitive   General   Chart Reviewed Yes   Response to Previous Treatment Patient with no complaints from previous session  Family/Caregiver Present Yes   Cognition   Overall Cognitive Status Impaired   Arousal/Participation Alert; Responsive; Cooperative   Attention Attends with cues to redirect   Orientation Level Oriented to person;Oriented to place; Disoriented to time;Oriented to situation   Memory Decreased recall of biographical information;Decreased recall of recent events;Decreased recall of precautions   Following Commands Follows one step commands with increased time or repetition   Subjective   Subjective "I feel good today"   Bed Mobility   Supine to Sit 6  Modified independent   Additional items HOB elevated   Sit to Supine 6  Modified independent   Additional items Assist x 1;Bedrails;Verbal cues   Transfers   Sit to Stand 5  Supervision   Additional items Assist x 1;Verbal cues   Stand to Sit 5  Supervision   Additional items Assist x 1;Verbal cues   Ambulation/Elevation   Gait pattern WNL  (occasional veering- self corrects; good magno)   Gait Assistance 5  Supervision   Additional items Assist x 1;Verbal cues   Assistive Device Rolling walker  (reduced awareness of placement of B UEs on RW)   Distance 125'   Stair Management Assistance 5  Supervision   Additional items Assist x 1;Verbal cues   Stair Management Technique With walker; Step to pattern; Foreward;Backward   Number of Stairs 1  (x10 trials onto PT  step)   Balance   Static Sitting Normal   Dynamic Sitting Good   Static Standing Fair +   Dynamic Standing Fair +   Ambulatory Fair +   Endurance Deficit   Endurance Deficit Yes   Activity Tolerance   Activity Tolerance Patient limited by fatigue;Patient tolerated treatment well   Nurse Made Aware Yes, RN Yane verbalized pt appropriate for PT session, made aware of outcomes   Assessment   Prognosis Good   Problem List Impaired balance;Decreased mobility; Decreased cognition   Assessment Pt seen for PT treatment session this date with interventions consisting of gait training w/ emphasis on improving pt's ability to ambulate level surfaces x 125 ft with close S provided by therapist with RW, therapeutic activity consisting of training: supine<>sit transfers, sit<>stand transfers, static sitting tolerance at EOB for 5 minutes w/ no UE support and vc and tactile cues for static standing posture faciliation and navigating 1 stairs w/ B UE support on RW handrail with step to pattern with close S x 10 reps  Pt agreeable to PT treatment session upon arrival, pt found supine in bed w/ HOB elevated, in no apparent distress, A&O x 3 and responsive  In comparison to previous session, pt with improvements in demonstrating consistency w/ level surface mobility x household and short community distances  Minor lateral veering observed w/ RW use, however pt self corrected to neutral  Pt demonstrating ability to ascend/descend 1 PT  step x 10 trials, good endurance observed w/ such  Pt requiring increased time for placement of R UE onto RW handle  Post session: pt returned BTB, all needs in reach and RN notified of session findings/recommendations  Continue to recommend Home PT vs  STR at time of d/c in order to maximize pt's functional independence and safety w/ mobility  Pt continues to be functioning below baseline level, and remains limited 2* factors listed above  PT will continue to see pt while here in order to address the deficits listed above and provide interventions consistent w/ POC in effort to achieve STGs     Barriers to Discharge None  (pt reporting she has good support from )   Goals   Patient Goals "to go home"   STG Expiration Date 12/06/17   Treatment Day 2   Plan   Treatment/Interventions Functional transfer training;Elevations; Therapeutic exercise; Endurance training;Cognitive reorientation;Patient/family training;Equipment eval/education; Bed mobility;Gait training;Continued evaluation;Spoke to nursing   Progress Progressing toward goals   PT Frequency 5x/wk   Recommendation   Recommendation Home PT; Home with family support  (HHPT vs  STR, pending progress)   Equipment Recommended Walker   PT - OK to Discharge Yes  (when medically cleared)   Additional Comments pt agreeable to [address] PT    Time of PT treatment session: 6595-1473

## 2017-11-29 NOTE — DISCHARGE INSTRUCTIONS
YOUR DOSES OF COREG, NORVASC AND LIPITOR HAS BEEN INCREASED  YOU ARE TO TAKE BOTH ASPIRIN AND PLAVIX DAILY          Chronic Kidney Disease Diet   WHAT YOU NEED TO KNOW:   A chronic kidney disease diet limits protein, phosphorus, sodium, and potassium  Liquids may also need to be limited in later stages of chronic kidney disease  This diet can help slow down the rate of damage to your kidneys  Your diet may change over time as your health condition changes  You may also need to make other diet changes if you have other health problems, such as diabetes  DISCHARGE INSTRUCTIONS:   Diet changes: There are 5 stages of chronic kidney disease  The diet changes you need to make are based on your stage of kidney disease  Work with your dietitian or healthcare provider to plan meals that are right for you  You may need any of the following:  · Limit protein  in all stages of kidney disease  Limit the portion sizes of protein you eat to limit the amount of work your kidneys have to do  Foods that are high in protein are meat, poultry (chicken and turkey), fish, eggs, and dairy (milk, cheese, yogurt)  Your healthcare provider will tell you how much protein to eat each day  · Limit sodium  if you have high blood pressure  Limit your sodium intake to less than 2,300 milligrams (mg) each day  Ask your dietitian or healthcare provider how much sodium you can have each day  The amount of sodium you should have depends on your stage of kidney disease  Table salt, canned foods, soups, salted snacks, and processed meats, like deli meats and sausage, are high in sodium  · Limit the amount of phosphorus  you eat  Your kidneys cannot get rid of extra phosphorus that builds up in your blood  This may cause your bones to lose calcium and weaken  Foods that are high in phosphorus are dairy products, beans, peas, nuts, and whole grains  Phosphorus is also found in cocoa, beer, and cola drinks   Your healthcare provider will tell you how much phosphorus you can have each day  · Limit potassium  if your potassium blood levels are too high  Your dietitian or healthcare provider will tell you if you need to limit potassium  Potassium is found in fruits and vegetables  · Limit liquids  as directed  Your healthcare provider may recommend that you limit liquids in stages 4 and 5 of kidney disease  If your body retains fluids, you will have swelling and fluid may build up in your lungs  This can cause other health problems, such as shortness of breath  Foods you may include: Your dietitian will tell you how many servings you can have from each of the food groups below  The approximate amount of these nutrients is listed next to each food group  Read the food label to find the exact amount  · Bread, cereal, and grains: These foods contain about 80 calories, 2 grams (g) of protein, 150 mg of sodium, 50 mg of potassium, and 30 mg of phosphorus  ¨ 1 slice (1 ounce) of bread ([place], [place], raisin, light rye, or sourdough white), small dinner roll, or 6-inch tortilla    ¨ ½ of a hamburger bun, hot dog bun, or English muffin or ¼ of a bagel    ¨ 1 cup of unsweetened cereal or ½ cup of cooked cereal, such as cream of wheat    ¨ ? cup of cooked pasta (noodles, macaroni, or spaghetti) or rice    ¨ 4 (2-inch) unsalted crackers or 3 squares of rosemarie crackers    ¨ 3 cups of air-popped, unsalted popcorn    ¨ ¾ ounce of unsalted pretzels    · Vegetables:  A serving of these foods contains about 30 calories, 2 g of protein, 50 mg of sodium, and 50 mg of phosphorus       ¨ Low potassium (less than 150 mg):      ¨ ½ cup cooked green beans, cabbage, cauliflower, beets, or corn    ¨ 1 cup raw cucumber, endive, alfalfa sprouts, cabbage, cauliflower, or watercress    ¨ 1 cup of all types of lettuce    ¨ ¼ cup cooked or ½ cup raw mushrooms or onions    ¨ 1 cup cooked eggplant    ¨ Medium potassium (150 to 250 mg):      ¨ 1 cup raw broccoli, celery, or zucchini    ¨ ½ cup cooked asparagus, broccoli, celery, green peas, summer squash, zucchini, or peppers    ¨ 1 cup cooked kale or turnips    · Fruits:  A serving of these foods contains about 60 calories, 0 g protein, 0 mg sodium, and 150 mg of phosphorus  Each serving is ½ cup, unless another amount is given  ¨ Low potassium (less than 150 mg): ¨ Apple juice, applesauce, or 1 small apple    ¨ Blueberries    ¨ Cranberries or cranberry juice cocktail    ¨ Fresh or canned pears (light syrup or packed in water)    ¨ Grapes or grape juice    ¨ Canned peaches (light syrup or packed in water)    ¨ Pineapple or strawberries    ¨ 1 tangerine     ¨ Watermelon    ¨ Medium potassium (150 to 250 mg):      ¨ Fresh peaches or pears    ¨ Cherries    ¨ Cantaloupe, benny, or papaya    ¨ Grapefruit or grapefruit juice    · Meat, poultry, and fish: These foods have about 75 calories, 7 g of protein, an average of 65 mg of sodium, 115 mg of potassium, and 70 mg of phosphorus  Do not use salt to prepare these foods  ¨ 1 ounce of cooked beef, pork, or poultry    ¨ 1 ounce of any fresh or frozen fish, lobster, shrimp, crab, clams, tuna, unsalted canned salmon, or unsalted sardines    · Other protein foods: These foods have about 90 calories, 7 g of protein, an average of 100 mg of sodium, 100 mg of potassium, and 120 mg of phosphorus  ¨ 1 large whole egg or ¼ cup of low-cholesterol egg substitute    ¨ 1 ounce of cheese    ¨ ¼ cup of cottage cheese or tofu    ¨ 1 ounce of unsalted nuts or 2 tablespoons of peanut butter    · Fats: These foods have very little protein and about 45 calories, 55 milligrams of sodium, 10 milligrams of potassium, and 5 milligrams of phosphorus  Include healthy fats, such as unsaturated fats, which are listed below      ¨ 1 teaspoon margarine or mayonnaise     ¨ 1 teaspoon oil (safflower, sunflower, corn, soybean, olive, peanut, canola)    ¨ 1 tablespoon oil-based salad dressing (such as [place]) or 2 tablespoons mayonnaise-based salad dressing (such as ranch)  Foods to limit or avoid:   · Starches: The following foods have higher amounts of sodium, potassium, or phosphorus  ¨ Biscuits, muffins, pancakes, and waffles     ¨ Cake and cornbread from boxed mixes    ¨ Oatmeal and whole-wheat cereals    ¨ Salted pretzel sticks or rings and sandwich cookies    · Meat and protein foods: The following are high in sodium and phosphorus  ¨ Deli-style meat, such as roast beef, ham, and turkey    ¨ Canned salmon and sardines    ¨ Processed cheese, such as American cheese and cheese spreads    ¨ Smoked or cured meat, such as corned beef, tejeda, ham, hot dogs, and sausage    · Legumes: These foods have about 90 calories, 6 g of protein, less than 10 mg of sodium, 250 mg of potassium, and 100 mg of phosphorus  ¨ ? cup of black beans, red kidney beans, black-eye peas, garbanzos, and lentils    ¨ ¼ cup of green or mature soybeans    · Dairy: The following foods have about 8 g of protein, an average of 120 mg of sodium, 350 mg of potassium, and 220 mg of phosphorus  ¨ 1 cup of milk (fat-free, low-fat, whole, buttermilk, or chocolate milk)    ¨ 1 cup of low-fat plain or sugar-free yogurt or ice cream    ¨ ½ cup of pudding or custard    ¨ Nondairy milk substitutes: These foods have 75 calories, 1 gram of protein, and an average of 40 mg of sodium, 60 mg of potassium, and 60 mg of phosphorus  A serving is ½ cup of almond, rice, or soy milk, or nondairy creamer  · Vegetables: The following vegetables are high in potassium  Each serving has more than 250 mg of potassium  A serving is ½ cup, unless another amount is given      ¨ Artichoke or ¼ of a medium avocado    ¨ [place] sprouts, beets, chard, elena or mustard greens    ¨ Potatoes, sweet potatoes, pumpkin, and yams    ¨ ¾ cup of okra    ¨ Raw tomatoes and low-sodium tomato juice, or tomato sauce    ¨ Winter squash, cooked asparagus, and cooked spinach    · Fruit:  The following fruits are high in potassium  Each serving has more than 250 mg of potassium  ¨ 3 fresh apricots    ¨ 1 small nectarine (2 inches across)    ¨ 1 small orange and ½ cup of orange juice    ¨ ¼ cup of dates     ¨ ? of a small honeydew melon    ¨ 1 six-inch banana     ¨ ½ cup of prune juice or prunes and kiwifruit    · Fats:  Limit unhealthy fats, such as saturated fats, which are listed below  ¨ Butter, lard, cream cheese, whipped cream, and sour cream    ¨ Powdered coffee creamer    · Other: The following foods are high in sodium  ¨ Frozen dinners, soups, and fast foods, such as hamburgers and pizza (see the food label for serving sizes)    ¨ Table salt and seasoned salts, such as onion or garlic salt    ¨ Barbecue sauce, ketchup, mustard, soy sauce, steak sauce, and teriyaki sauce  Contact your healthcare provider if:   · You are gaining or losing weight very quickly  · You have shortness of breath  · You have nausea and are vomiting  · You feel very weak and tired  · You are having trouble following the chronic kidney disease diet  © 2017 [facility] Information is for End User's use only and may not be sold, redistributed or otherwise used for commercial purposes  All illustrations and images included in CareNotes® are the copyrighted property of [employer]  or [employer]  The above information is an  only  It is not intended as medical advice for individual conditions or treatments  Talk to your doctor, nurse or pharmacist before following any medical regimen to see if it is safe and effective for you  Chronic Kidney Disease   WHAT YOU NEED TO KNOW:   Chronic kidney disease (CKD) is the gradual and permanent loss of kidney function  It is also called chronic kidney failure, or chronic renal insufficiency  Normally, the kidneys remove fluid, chemicals, and waste from your blood  These wastes are turned into urine by your kidneys  CKD may worsen over time and lead to kidney failure  DISCHARGE INSTRUCTIONS:   Seek care immediately if:   · You are confused and very drowsy  · You have a seizure  · You have shortness of breath  Contact your healthcare provider if:   · You suddenly gain or lose more weight than your healthcare provider has told you is okay  · You have itchy skin or a rash  · You urinate more or less than you normally do  · You have blood in your urine  · You have nausea and repeated vomiting  · You have fatigue or muscle weakness  · You have hiccups that will not stop  · You have questions or concerns about your condition or care  Medicines:   · Medicines  may be given to decrease blood pressure and get rid of extra fluid  You may also receive medicine to manage health conditions that may occur with CKD, such as anemia, diabetes, and heart disease  · Take your medicine as directed  Contact your healthcare provider if you think your medicine is not helping or if you have side effects  Tell him or her if you are allergic to any medicine  Keep a list of the medicines, vitamins, and herbs you take  Include the amounts, and when and why you take them  Bring the list or the pill bottles to follow-up visits  Carry your medicine list with you in case of an emergency  Follow up with your healthcare provider as directed: You will need to return for tests to monitor your kidney function  You may also be referred to a kidney specialist  Write down your questions so you remember to ask them during your visits  Manage other health conditions: Follow your healthcare provider's directions on how to manage diabetes, high blood pressure, and heart disease  These conditions can make CKD worse  Talk to your healthcare provider before you take over-the-counter medicine  Medicines such as NSAIDs, stomach medicine, or laxatives may harm your kidneys  Weigh yourself daily:  Ask your healthcare provider what your weight should be  Ask how much liquid you should drink each day  CKD may cause you to gain or lose weight rapidly  Weigh yourself every day  Write down your weight, how much liquid you drink or eat, and how much you urinate each day  Contact your healthcare provider if your weight is higher or lower than it should be  Manage CKD:   · Maintain a healthy weight  Ask your healthcare provider how much you should weigh  Ask him to help you create a weight loss plan if you are overweight  · Exercise 30 to 60 minutes a day, 4 to 7 times a week, or as directed  Ask about the best exercise plan for you  Regular exercise can help you manage CKD, high blood pressure, and diabetes  · Follow your healthcare provider's advice about what to eat and drink  He may tell you to eat food low in sodium (salt), potassium, phosphorus, or protein  You may need to see a dietitian if you need help planning meals  Ask how much liquid to drink each day and which liquids are best for you  · Limit alcohol  Ask how much alcohol is safe for you to drink  A drink of alcohol is 12 ounces of beer, 5 ounces of wine, or 1½ ounces of liquor  · Do not smoke  Nicotine and other chemicals in cigarettes and cigars can cause lung and kidney damage  Ask your healthcare provider for information if you currently smoke and need help to quit  E-cigarettes or smokeless tobacco still contain nicotine  Talk to your healthcare provider before you use these products  · Ask your healthcare provider if you need vaccines  Infections such as pneumonia, influenza, and hepatitis can be more harmful or more likely to occur in a person who has CKD  Vaccines reduce your risk of infection with these viruses  © 2017 [employer] Information is for End User's use only and may not be sold, redistributed or otherwise used for commercial purposes   All illustrations and images included in [employer] are the copyrighted property of [employer]  or [employer]  The above information is an  only  It is not intended as medical advice for individual conditions or treatments  Talk to your doctor, nurse or pharmacist before following any medical regimen to see if it is safe and effective for you  Carotid Artery Disease   WHAT YOU NEED TO KNOW:   Carotid artery disease is a condition that causes narrow or blocked carotid arteries  Your carotid arteries are the blood vessels that supply your brain with most of the blood it needs to work  You have 2 carotid arteries, one on each side of your neck  DISCHARGE INSTRUCTIONS:   Call 911 for any of the following:   · You have any of the following signs of a stroke:      ¨ Numbness or drooping on one side of your face     ¨ Weakness in an arm or leg    ¨ Confusion or difficulty speaking    ¨ Dizziness, a severe headache, or vision loss    · You have any of the following signs of a heart attack:      ¨ Squeezing, pressure, or pain in your chest that lasts longer than 5 minutes or returns    ¨ Discomfort or pain in your back, neck, jaw, stomach, or arm     ¨ Trouble breathing    ¨ Nausea or vomiting    ¨ Lightheadedness or a sudden cold sweat, especially with chest pain or trouble breathing  Contact your healthcare provider if:   · You have questions or concerns about your condition or care  Medicines:   · Take aspirin if directed  Your healthcare provider may suggest that you take an aspirin a day to prevent blood clots from forming in the carotid arteries  If your healthcare provider wants you to take aspirin daily, do not take acetaminophen or ibuprofen instead  · Take your medicine as directed  Contact your healthcare provider if you think your medicine is not helping or if you have side effects  Tell him or her if you are allergic to any medicine   Keep a list of the medicines, vitamins, and herbs you take  Include the amounts, and when and why you take them  Bring the list or the pill bottles to follow-up visits  Carry your medicine list with you in case of an emergency  Warning signs of a stroke: The word F A S T  can help you remember and recognize warning signs of a stroke  · F = Face:  One side of the face droops  · A = Arms:  One arm starts to drop when both arms are raised  · S = Speech:  Speech is slurred or sounds different than usual     · T = Time:  A person who is having a stroke needs to be seen immediately  A stroke is a medical emergency that needs immediate treatment  Some medicines and treatments work best if given within a few hours of a stroke  Manage carotid artery disease:   · Eat a variety of healthy foods  Healthy foods include fruit, vegetables, whole-grain breads, low-fat dairy products, lean meat, and fish  Choose fish that are high in omega-3 fatty acids, such as salmon and fresh tuna  Ask your healthcare provider for more information on a heart healthy diet and the DASH eating plan  · Limit sodium (salt)  Sodium may increase your blood pressure  Add less table salt to your foods  Read food labels and choose foods that are low in sodium  Your healthcare provider may suggest you follow a low sodium diet  · Reach or maintain a healthy weight  Extra weight makes your heart work harder  Ask your healthcare provider how much you should weight  He can help you create a safe weight loss plan  Even a weight loss of 10% of your body weight can help your heart function better  · Exercise as directed  Exercise helps improve heart function and can help you manage your weight  Exercise can also help lower your cholesterol and blood sugar levels  Try to get at least 30 minutes of exercise at least 5 times each week  Try to be active every day  Your healthcare provider can help you create an exercise plan that works best for you  · Limit alcohol    Alcohol can increase your blood pressure and triglyceride levels  Men should limit alcohol to 2 drinks per day  Women should limit alcohol to 1 drink per day  A drink of alcohol is 12 ounces of beer, 5 ounces of wine, or 1½ ounces of liquor  · Do not smoke  Nicotine and other chemicals in cigarettes and cigars can cause heart and lung damage  Ask your healthcare provider for information if you currently smoke and need help to quit  E-cigarettes or smokeless tobacco still contain nicotine  Talk to your healthcare provider before you use these products  Follow up with your healthcare provider as directed:  Write down your questions so you remember to ask them during your visits  © 2017 [employer]  Information is for End User's use only and may not be sold, redistributed or otherwise used for commercial purposes  All illustrations and images included in CareNotes® are the copyrighted property of [employer]  or [employer]  The above information is an  only  It is not intended as medical advice for individual conditions or treatments  Talk to your doctor, nurse or pharmacist before following any medical regimen to see if it is safe and effective for you  Effects of a Stroke   WHAT YOU NEED TO KNOW:   The effects of a stroke may be different for everyone  They may depend on where the stroke happened in your brain and how much damage occurred there  Some people may make a full recovery  Other people may have long-term effects  It is important to talk to your healthcare provider about any symptoms you have after a stroke  There are medicines and therapies to help manage the effects of a stroke     DISCHARGE INSTRUCTIONS:   Call 911 or have someone else call 911 for any of the following:   · You have any of the following signs of a stroke:      ¨ Numbness or drooping on one side of your face     ¨ Weakness in an arm or leg    ¨ Confusion or difficulty speaking    ¨ Dizziness, a severe headache, or vision loss    ·            · You cannot be woken up  · You fall and hit your head  · You have a seizure  · You feel lightheaded, short of breath, and have chest pain  · You cough up blood  Seek care immediately if:   · Your arm or leg is painful, red, or larger than normal      · You feel weak, dizzy, or faint  · You have a fall without hitting your head  · Your blood sugar level or blood pressure is higher or lower than your healthcare provider said it should be  · You bleed heavily or cannot stop bleeding from a cut or injury  · You have a new, severe headache  Contact your healthcare provider if:   · You have a fever  · You have a rash  · You have new or worsening symptoms  · You feel extremely sad or anxious  · You feel you are unable to cope with your condition  · You have trouble sleeping  · You have open sores  · You choke or cough when eating or drinking  · You have questions or concerns about your condition or care  Problems with language, speech, and memory:   · Difficulty finding the right words or putting complete sentences together    · Difficulty putting words together that make sense    · Difficulty paying attention or a short attention span    · Difficulty writing or reading    · Problems with memory or being disoriented to time, place, or situation    · Problems thinking clearly or feeling confused    · Difficulty understanding written or spoken language or learning something new  Muscle and nerve problems:  A stroke may affect one side of your body or part of one side  You may be at an increased risk for falling if you have difficulty moving your leg muscles   You may have any of the following:  · Inability to move your arm, leg, or one side of your face (paralysis)    · Muscle weakness, spasms, or muscles that stay in one position (contracted)    · Poor balance, difficulty walking, or difficulty grasping objects    · Changes in your vision or poor vision    · Ignoring, or being unaware of one side of your body    · Numbness of your arm, hand, fingers, leg, foot, or toes    · Pain, tickling, or prickling in weak or paralyzed parts of your body  Bowel and bladder problems:   · Loss of control of your urine or bowel movements    · Feeling like you have to urinate frequently, even when your bladder is not full    · Difficulty emptying your bladder or constipation  Swallowing and eating problems:   · Difficulty swallowing food    · Difficulty feeding yourself    · A lack of taste or a change in the way you taste things    · An increased risk for aspiration (food moves into the lungs) and pneumonia due to swallowing problems  Changes in personality or mood:   · Depression, sadness, irritability, or hopelessness    · Anger, frustration, or anxiety    · Difficulty controlling emotions or expressing inappropriate emotions    · Quick mood changes  Fatigue:   · Low energy levels    · Tiring easily    · A lack of motivation  Problems sleeping:   · Development of sleep apnea    · Changes in sleep such as sleeping too much or not enough  Problems with sexual function:   · Difficulty having or keeping an erection    · Vaginal dryness    · A decreased interest in sex  Self care after a stroke:   · Go to rehabilitation (rehab) as directed  Rehab is an important part of treatment  A speech therapist helps you relearn or improve your ability to talk and swallow  You may start slowly and start doing more difficult tasks over time  Physical therapists can help you gain strength and build endurance  Occupational therapists teach you new ways to do daily activities, such as getting dressed  Therapy can help you improve your ability to walk or keep your balance  Your therapy may include tasks or movements you will need to do for everyday activities  An example is being able to raise or lower yourself from a chair      · Prevent falls in your home  Remove anything you might trip over  Tape electrical cords down  Keep paths clear throughout your home  Make sure your home is well lighted  Put nonslip materials on surfaces that might be slippery  An example is your bathtub or shower floor  · Use assistive devices  A cane or walker may help you keep your balance as you walk  · Manage other medical conditions  Manage your heart conditions, blood pressure, and diabetes  Management of these conditions can reduce your risk for another stroke  Take your medicines as directed  Follow your healthcare provider's instructions for diet  · Join a support group  Life after a stroke can be difficult  It may be helpful to talk to others who have had a stroke  There are also support groups for family members or support persons of those whom have had a stroke  Ask your healthcare provider for more information about support groups  Know the signs of depression: Depression may happen after you have a stroke  Depression can lower your quality of life and cause you to stop doing things to help you be stronger  Depression can cause you to become less independent  Know the signs of depression so that you can receive help  Tell your family and friends that if they see these signs, to let your healthcare provider know  You may show any of the following signs of depression:  · Extreme sadness    · Avoiding social interaction with family or friends    · A lack of interest in things you once enjoyed    · Irritability    · Trouble sleeping    · Low energy levels    · A change in eating habits or sudden weight gain or loss  Driving after a stroke:  Do not drive a car without talking to your healthcare provider or occupational therapist  The effects of a stroke may make it difficult or unsafe for you to drive  You may need to have a  evaluation before you can safely and legally drive a car   You may also need to take a  training program or get special equipment installed in your car  Ask your healthcare provider for more information about driving after a stroke  For more information and support:   · National Stroke Association  [address] , [place] [address]  Phone: 0- 855 - 606-6074  Web Address: [employer]  Follow up with your healthcare provider as directed: You may need to come in for regular tests of your brain function  If you are taking warfarin, you will need to come in for regular blood tests  Your INR levels will also need to be checked  These tests help make sure you are taking the right amount of warfarin  Write down your questions so you remember to ask them during your visits  © 2017 [address] Information is for End User's use only and may not be sold, redistributed or otherwise used for commercial purposes  All illustrations and images included in CareNotes® are the copyrighted property of A [employer] A M [employer]  or [employer]  The above information is an  only  It is not intended as medical advice for individual conditions or treatments  Talk to your doctor, nurse or pharmacist before following any medical regimen to see if it is safe and effective for you  Ischemic Stroke   WHAT YOU NEED TO KNOW:   An ischemic stroke occurs when blood flow to a part of your brain is blocked  The blockage is usually caused by a blood clot that gets stuck in a narrow blood vessel  When oxygen cannot get to an area of the brain, tissue in that area may get damaged  The damage can cause loss of body functions controlled by that area of the brain  DISCHARGE INSTRUCTIONS:   Call 911 for any of the following:   ·            · You have any of the following signs of a stroke:      ¨ Numbness or drooping on one side of your face     ¨ Weakness in an arm or leg    ¨ Confusion or difficulty speaking    ¨ Dizziness, a severe headache, or vision loss    · You have a seizure      · You feel lightheaded, short of breath, and have chest pain  · You cough up blood  · You have a severe headache, or loss of balance or coordination  Seek care immediately if:   · Your arm or leg feels warm, tender, and painful  It may look swollen and red  · You have double vision or vision loss  · You have unusual or heavy bleeding  Contact your healthcare provider or neurologist if:   · Your blood pressure is higher or lower than you were told it should be  · You have questions or concerns about your condition or care  Warning signs of a stroke: The word F A S T  can help you remember and recognize signs of a stroke:  · F = Face:  One side of the face droops  · A = Arms:  One arm starts to drop when both arms are raised  · S = Speech:  Speech is slurred or sounds different than usual     · T = Time:  A person who is having a stroke needs to be seen immediately  A stroke is a medical emergency that needs immediate treatment  Some medicines and treatments work best if given within a few hours of a stroke  Early treatment can decrease the risk of long-term effects of a stroke  ·        Medicines: You may  need any of the following:  · Thrombolytics  help break apart clots  · Antiplatelets , such as aspirin, help prevent blood clots  Take your antiplatelet medicine exactly as directed  These medicines make it more likely for you to bleed or bruise  If you are told to take aspirin, do not take acetaminophen or ibuprofen instead  · Blood thinners    help prevent blood clots  Examples of blood thinners include heparin and warfarin  Clots can cause strokes, heart attacks, and death  The following are general safety guidelines to follow while you are taking a blood thinner:    ¨ Watch for bleeding and bruising while you take blood thinners  Watch for bleeding from your gums or nose  Watch for blood in your urine and bowel movements   Use a soft washcloth on your skin, and a soft toothbrush to brush your teeth  This can keep your skin and gums from bleeding  If you shave, use an electric shaver  Do not play contact sports  ¨ Tell your dentist and other healthcare providers that you take anticoagulants  Wear a bracelet or necklace that says you take this medicine  ¨ Do not start or stop any medicines unless your healthcare provider tells you to  Many medicines cannot be used with blood thinners  ¨ Tell your healthcare provider right away if you forget to take the medicine, or if you take too much  ¨ Warfarin  is a blood thinner that you may need to take  The following are things you should be aware of if you take warfarin  § Foods and medicines can affect the amount of warfarin in your blood  Do not make major changes to your diet while you take warfarin  Warfarin works best when you eat about the same amount of vitamin K every day  Vitamin K is found in green leafy vegetables and certain other foods  Ask for more information about what to eat when you are taking warfarin  § You will need to see your healthcare provider for follow-up visits when you are on warfarin  You will need regular blood tests  These tests are used to decide how much medicine you need  · Other medicines  may be given to treat other health conditions such as high cholesterol, high blood pressure, or diabetes  · Take your medicine as directed  Contact your healthcare provider if you think your medicine is not helping or if you have side effects  Tell him or her if you are allergic to any medicine  Keep a list of the medicines, vitamins, and herbs you take  Include the amounts, and when and why you take them  Bring the list or the pill bottles to follow-up visits  Carry your medicine list with you in case of an emergency  Follow up with your healthcare provider or neurologist as directed: You may need to come in for regular tests of your brain function  You may also need regular blood tests   Write down your questions so you remember to ask them during your visits  Self-care:   · Go to rehabilitation (rehab) as directed  Rehab is an important part of treatment  A speech therapist helps you relearn or improve your ability to talk and swallow  You may start slowly and start doing more difficult tasks over time  Physical therapists can help you gain strength and build endurance  Occupational therapists teach you new ways to do daily activities, such as getting dressed  Therapy can help you improve your ability to walk or keep your balance  Your therapy may include tasks or movements you will need to do for everyday activities  An example is being able to raise or lower yourself from a chair  · Make your home safe  Remove anything you might trip over  Tape electrical cords down  Keep paths clear throughout your home  Make sure your home is well lit  Put nonslip materials on surfaces that might be slippery  An example is your bathtub or shower floor  · Use walking devices as directed  A cane or walker may help you keep your balance as you walk  What you need to know about depression:  Depression can happen after a stroke  Talk to your healthcare provider if you have depression that continues or is getting worse  Your provider may be able to help treat your depression  Your provider can also recommend support groups for you to join  A support group is a place to talk with others who have had a stroke  It may also help to talk to friends and family members about how you are feeling  Tell your family and friends that if they see these signs, to let your healthcare provider know   You may show any of the following signs of depression:  · Extreme sadness    · Avoiding social interaction with family or friends    · A lack of interest in things you once enjoyed    · Irritability    · Trouble sleeping    · Low energy levels    · A change in eating habits or sudden weight gain or loss  Decrease your risk for another stroke:   · Manage health conditions  Take your medicine as directed  Check your blood pressure and blood sugar levels as directed  Keep a record and bring it to your follow-up visits  · Eat a variety of healthy foods  Healthy foods include whole-grain breads, low-fat dairy products, beans, fish, and lean meats  Eat at least 5 servings of fruits and vegetables each day  Choose foods that are low in salt, unhealthy fats (saturated and trans fat), salt, and sugar  Eat foods that are high in potassium, such as potatoes and bananas  Ask your dietitian what other nutrition guidelines you should follow  He or she can help you choose foods that are right for you  · Maintain a healthy weight  Ask your healthcare provider how much you should weigh  Ask him or her to help you create a weight loss plan if you are overweight  Ask about the best exercise plan for you  · Do not drink alcohol  Alcohol can increase your risk for a stroke  Alcohol may also increase your blood pressure or thin your blood  Blood thinning can increase your risk for hemorrhagic stroke  · Do not smoke cigarettes or use illegal drugs  Smoking and drugs increase your risk for a stroke  Nicotine and other chemicals in cigarettes and cigars can also cause lung damage  Ask your healthcare provider for information if you currently smoke or use drugs and need help to quit  E-cigarettes or smokeless tobacco still contain nicotine  Talk to your healthcare provider before you use these products  For support and more information:   · National Stroke Association  [address] , [place] [address]  Phone: 7- 799 - 461-2189  Web Address: [employer]  org  © 2017 [address] Information is for End User's use only and may not be sold, redistributed or otherwise used for commercial purposes   All illustrations and images included in CareNotes® are the copyrighted property of A D A M , Inc  or [employer]  The above information is an  only  It is not intended as medical advice for individual conditions or treatments  Talk to your doctor, nurse or pharmacist before following any medical regimen to see if it is safe and effective for you  Stroke Prevention   AMBULATORY CARE:   Stroke prevention  includes a number of ways that you can decrease your risk for a stroke  Talk to your healthcare provider about your risk for stroke  Create a plan with him to decrease your risk  Know your risk factors: The following are risk factors for a stroke:  · Age greater than 54 or being -American    · A family history of stroke or heart attack, or a personal history of transient ischemic attacks (TIA)    · Diabetes or high cholesterol    · Atrial fibrillation, high blood pressure, or heart disease    · Smoking cigarettes, excessive use of alcohol, or using drugs such as cocaine    · Not enough physical activity, or obesity    · Taking birth control pills, especially in women older than 35 who smoke cigarettes  Stay active:  Physical activity for 30 minutes most days of the week can lower your blood pressure, cholesterol, weight, and blood sugar levels  It can also reduce your risk for a stroke  Find an exercise that you enjoy  This will make it easier for you to exercise every day  Ask your healthcare provider about the best exercise plan for you  Follow a heart healthy diet:  A heart healthy diet may help control your blood pressure, and prevent atherosclerosis or coronary artery disease  It may also help you manage your weight and blood sugar levels  A heart healthy diet is an eating plan low in total fat, unhealthy fats, and sodium (salt)  Limit sodium to less than 2,300 mg each day  Eat plenty of fruits and vegetables  Ask your healthcare provider for more information about a heart healthy diet  Maintain a healthy weight:  Being overweight or obese can increase your risk for a stroke  Ask your healthcare provider how much you should weigh  Limit alcohol:  Heavy alcohol use can increase your risk for a stroke  Ask your healthcare provider if it is okay for you to drink alcohol  A drink of alcohol is 12 ounces of beer, 5 ounces of wine, or 1½ ounces of liquor  Do not smoke:  Nicotine and other chemicals in cigarettes and cigars can cause lung and heart damage  Heart and lung damage can increase your risk for a stroke  Ask your healthcare provider for information if you currently smoke and need help to quit  E-cigarettes or smokeless tobacco still contain nicotine  Talk to your healthcare provider before you use these products  Do not use illegal drugs: The use of cocaine and other illegal drugs can cause a stroke  Talk to your healthcare provider if you currently use cocaine or other drugs and need help to quit  Manage your heart conditions:  Management of your blood pressure and heart conditions may reduce your risk for a stroke  Atrial fibrillation (a-fib), an abnormal heart rhythm, can cause blood clots  Blood clots may happen in your brain and cause a stroke  You may need to do the following to manage your blood pressure or heart conditions:  · Medicine  may be needed to lower your blood pressure, prevent blood clots, or control abnormal heart rhythms  You may also need medicine to lower your cholesterol  Talk to your healthcare provider about a daily aspirin or blood thinner to reduce your risk for a blood clot  · Regular blood pressure screening  will help reduce your risk for stroke  You can monitor your blood pressure at home  Ask your healthcare provider how often to check your blood pressure and what your blood pressure should be  Tell your healthcare provider if your blood pressure is higher than what he says it should be  He may need to change your medicine or help you make changes to your diet or exercise plan       · Surgery and other procedures  may be needed to remove blockages in your blood vessels and improve blood flow to your heart or brain  Procedures may also be needed to treat a-fib  Manage your sleep apnea:  Sleep apnea can cause high blood pressure, heart failure, arrhythmias, heart attack, and stroke  Get screened and treated for sleep apnea  Screening and treatment for sleep apnea may reduce your risk for stroke and other health problems  Talk to your healthcare provider about devices that help prevent complications from sleep apnea  You may need to use a special machine during sleep  The machine will increase your oxygen levels and keep your airways open  Manage your diabetes:  Good control of your blood sugar levels may decrease your risk for a stroke  If you take diabetes medicine or insulin, take it as directed  A healthy diet and exercise also help lower your blood sugar levels  Monitor your blood sugars as directed  Keep a record of your blood sugar levels and bring them to your appointments  This will help your healthcare provider make changes to your medicines  It may also help you find ways to get better control of your diabetes  Stroke risk factors for women:   · The use of birth control pills may increase your risk for a stroke  Smoking while taking birth control pills may also increase your risk for a stroke  Talk to your healthcare provider about other forms of contraception  · Estrogen levels drop during menopause  Low estrogen levels may increase your risk for stroke  Talk to your healthcare provider about hormone replacement therapy to reduce your risk for a stroke  Talk to your healthcare provider about other medical conditions: Other medical conditions such as anxiety, depression, and sickle cell disease, can increase your risk for a stroke  Depression and anxiety can cause stress on your heart  This may lead to high blood pressure or heart disease  Sickle cell disease, or sickle cell anemia, can cause blockages in blood vessels   If these blockages happen in the blood vessels in your brain, it may cause a stroke  Talk to your healthcare provider about how you can manage these health conditions and decrease your risk for a stroke  Follow up with your healthcare provider as directed: You may need a CT or MRI of your brain to check for problems that may cause stroke  Write down your questions so you remember to ask them during your visits  © 2017 [facility] Information is for End User's use only and may not be sold, redistributed or otherwise used for commercial purposes  All illustrations and images included in CareNotes® are the copyrighted property of A D A M , Inc  or [employer]  The above information is an  only  It is not intended as medical advice for individual conditions or treatments  Talk to your doctor, nurse or pharmacist before following any medical regimen to see if it is safe and effective for you  Stroke Rehab: PT, OT, ST, and Cognitive Therapy   AMBULATORY CARE:   Physical therapy (PT), occupational therapy (OT), speech therapy (ST), and cognitive therapy  may begin as soon as 24 to 48 hours after a stroke  Therapies may be done in a hospital, a long term care facility, or at home  It may also be done in an outpatient facility  The type and location of your therapy may depend on what effects you have from a stroke  The goals of therapy may include:  · Helping you regain as much independence as possible    · Helping you manage and live with the effects of a stroke    · Preventing further injury from falls and immobility  Contact your healthcare provider if:   · You have new or worsening symptoms  · You have questions or concerns about your condition or therapy  What family or support persons should know about PT, OT, and ST:  You can be a part of your loved one's recovery by doing the following:  · Go to therapy with your loved one    Learn exercises and ways to help them adapt to the effects of their stroke  Learn about medical equipment and how to use them in your home  · Help your loved one practice the skills they learn in therapy  Practice may help your loved one recover faster from a stroke  Help your loved one focus on strengths and skills that were not lost after a stroke  · Learn what therapies and services are available  Talk to your loved one's healthcare provider, , or  to learn about insurance coverage  There may be services to help with care in your home and transportation to appointments  There are also support groups for stroke survivors and their loved ones  Talk to health care providers to help find support  PT after a stroke:  Physical therapy may begin as soon as 24 to 48 hours after a stroke  A physical therapist will test your balance, coordination, and how well you move your muscles  A physical therapist will teach you exercises to help improve movement and strength, and to decrease pain  A physical therapist may help you do any of the following:  · Walk, improve balance, improve coordination, and prevent falls    · Learn how to use paralyzed or weak arms or legs    · Transfer from a bed to a chair or from a chair to standing    · Use assistive devices such as canes, walkers, splints, and braces    · Perform range of motion and resistance exercises    · Stretch stiff or tight muscles     · Strengthen bladder and bowel muscles to treat incontinence  OT after a stroke: An occupational therapist teaches you skills to help with your daily activities   Occupational therapy may help you do any of the following:  · Eat, dress, bathe, groom, or use the bathroom independently    · Learn ways to use your weak or paralyzed arm    · Focus attention on the side of your body that you may ignore    · Use special equipment such as button hooks for dressing or feeding aids    · Complete a series of tasks in a step by step way    · Determine what assistive devices you may need in your home  ST after a stroke: You may need speech therapy to help you relearn how to communicate and understand information  A speech-language pathologist, or speech therapist, is someone who teaches you exercises to improve communication  Speech therapy may also determine how well you can swallow and eat  A speech therapist may help you do the following:  · Strengthen muscles needed for speaking, swallowing, drinking, and eating    · Learn how to swallow and prevent choking     · Pronounce letters and words    · Put words together to form sentences    · Improve your memory, concentration, and problem solving skills    · Find other ways for you to communicate such as sign language, drawing, using communication boards, or using computer technology  Cognitive therapy after a stroke:  Cognitive therapy will help improve your memory, concentration, and learning  What you need to know about depression:  Depression can happen after a stroke  Talk to your healthcare provider if you have depression that continues or is getting worse  Your provider may be able to help treat your depression  Your provider can also recommend support groups for you to join  A support group is a place to talk with others who have had a stroke  It may also help to talk to friends and family members about how you are feeling  Tell your family and friends that if they see these signs, to let your healthcare provider know  You may show any of the following signs of depression:  · Extreme sadness    · Avoiding social interaction with family or friends    · A lack of interest in things you once enjoyed    · Irritability    · Trouble sleeping    · Low energy levels    · A change in eating habits or sudden weight gain or loss  Follow up with your healthcare provider as directed:  Keep all appointments for therapy  Write down your questions so you remember to ask them during your visits    © 2017 [facility] [address] [facility] is for End User's use only and may not be sold, redistributed or otherwise used for commercial purposes  All illustrations and images included in CareNotes® are the copyrighted property of A D A M , Inc  or [employer]  The above information is an  only  It is not intended as medical advice for individual conditions or treatments  Talk to your doctor, nurse or pharmacist before following any medical regimen to see if it is safe and effective for you  Support For Your Loved One After a Stroke   AMBULATORY CARE:   Call 911 for your loved one if:   · You have any of the following signs of a stroke:      ¨ Numbness or drooping on one side of your face     ¨ Weakness in an arm or leg    ¨ Confusion or difficulty speaking    ¨ Dizziness, a severe headache, or vision loss    ·            · You cannot wake the person  · The person falls and hits his or her head  · The person has a seizure  · The person is lightheaded, short of breath, and has chest pain  · The person coughs up blood  Seek care immediately for your loved one if:   · The person's arm or leg is painful, red, or larger than normal      · The person feels weak, dizzy, or faint  · The person falls without hitting his head  · Blood sugar level or blood pressure is higher or lower than the healthcare provider said it should be  · There is heavy bleeding or you cannot stop bleeding from a cut or injury  Contact your loved one's healthcare provider if:   · The person has a fever  · The person has a rash  · You notice new or worsening symptoms  · The person is extremely anxious or sad  · The person has trouble sleeping  · You notice open sores anywhere on the person's body  · The person chokes or coughs when eating or drinking  · You have questions or concerns about the person's condition or care  Know the warning signs of a stroke:   The word F A S T  can help you remember and recognize warning signs of a stroke  · F = Face:  One side of the face droops  · A = Arms:  One arm starts to drop when both arms are raised  · S = Speech:  Speech is slurred or sounds different than usual     · T = Time:  A person who is having a stroke needs to be seen immediately  A stroke is a medical emergency that needs immediate treatment  Most medicines and treatments work best the sooner they are given  ·        Understand your loved one's strengths and weaknesses:  A stroke can affect a person in many ways  Understand what he or she can do on his or her own, and what help may be needed  You can show support by doing the following:  · Encourage self-care tasks Encourage him or her to do as many self-care tasks as possible  Help build on his or her strengths  Do not point out what he or she used to be able to do  Some effects of a stroke may be permanent  · Help your loved one exercise his or her mind  Play games with your loved one, or give him or her crossword puzzles to do  Mind exercises may help improve problem solving skills and memory  · Give simple step-by-step directions  He or she may have difficulty paying attention or completing tasks  Use simple, short sentences to help him or her complete tasks or understand what needs to be done  · Be patient  Your loved one may think and act slower after a stroke  Let him or her know that you are there to give support  Know the signs of depression:  Depression can happen because of your loved one's condition after he or she has a stroke  Depression can lower your loved one's quality of life  It can cause him or her to stop doing things that can help him or her be stronger  Depression can cause him or her to become less independent  Know the signs of depression so that your loved one can receive help   Your loved one may show any of the following signs of depression:  · Extreme sadness    · Avoiding social interaction with family or friends    · A lack of interest in things he or she once enjoyed    · Irritability    · Trouble sleeping    · Low energy levels    · A change in eating habits or sudden weight gain or loss  Understand your loved one's treatment plan:  He or she may need a number of therapies or medicines after a stroke  It is important for you to know the following:  · The goals of therapy    · Reasons for new medicines, and what side effects to report    · The lifestyle changes he or she may need to make such as changes in diet, getting more exercise, or quitting smoking    · What assistive devices he or she may need and how they are used    · What equipment you may need in your home to care for him or her, such as hand rails or a raised toilet seat  Understand your loved one's insurance plan:  Find out what services are covered by his or her insurance plan  Understand the out-of-pocket costs  Talk to a  or  about services that are not covered  Be a part of your loved one's therapies:   · Go with your loved one to therapy  Learn exercises and ways to help him or her cope with the physical effects of his stroke  · Help practice the skills learned in therapy  More practice may improve his or her recovery from a stroke  · Help with range of motion (ROM) exercises  ROM exercises are gentle, slow movements of joints  Ask the physical therapist how to do ROM exercises  ROM exercises can help increase flexibility, and decrease pain  Help decrease your loved one's risk for another stroke:   · Prepare healthy foods to eat  Ask the healthcare provider if a special diet is needed  Healthy foods can decrease the risk for heart disease  It may also decrease the risk for other conditions that could cause another stroke  · Encourage him or her to quit smoking  Nicotine and other chemicals in cigarettes and cigars can cause lung damage or heart damage   It can also increase the risk for another stroke  Ask the healthcare provider for information if he or she currently smokes and needs help to quit  E-cigarettes or smokeless tobacco still contain nicotine  Talk to the healthcare provider before he or she uses these products  · Exercise with your loved one  Exercise may lower blood pressure and prevent heart disease  Find an exercise that you both enjoy and can do together  Talk to his or her healthcare provider about the best exercise program      · Make sure your loved one takes medicines as directed  Blood thinners and medicines to manage blood pressure or heart conditions can decrease the risk for another stroke  You may need to set an alarm  You may need to use other reminders to help him or her remember to take medicines on time  Decrease the risk for falls in his or her home: Your loved one may be at risk for falls  You can do the following to keep the home safe and decrease his or her risk for falls:  · Tape electrical cords down or remove them from hallways and walkways  · Keep paths clear throughout your home  · Make sure the home has good lighting  · Put nonslip materials on surfaces that might be slippery  An example is your bathtub or shower floor  · Encourage him or her to use assistive devices, and wear glasses or hearing aids  Also remind him or her to ask for help getting in and out of the shower  Ask for help: If you have trouble caring for your loved one, ask his or her healthcare provider where to get help  You may be able to get home health aids or nurses to come to the home  You may also be able to get help with transportation to appointments  Learn about resources in the community that may be helpful to you and your loved one  Join a support group: It may be helpful to discuss your fears or concerns with others that care for stroke survivors  Other caregivers may understand how you feel   You may get ideas about how to support your loved one or cope with stress  Your loved one's provider can help you find a support group  Take care of yourself:  Caring for a stroke survivor can be mentally, physically, and emotionally difficult  Ask family or friends to stay with your loved one so you can have a break  Take time for yourself to relax and enjoy activities  Eat healthy foods, exercise, and get plenty of rest    For support and more information:   · National Stroke Association  [address] , [place] [address]  Phone: 1- 527 - 114-6364  Web Address: [employer] au  org  · [employer] and Stroke  [address]  [place] [address]  Phone: 2- 096 - 736-3904  Phone: 8- 142 - 542-4315  Web Address: https://[employer]/  · American Stroke Association  [address]  [place] , [address] [facility]  Phone: 9- 232 - 658-3882  Web Address: [employer] za  org  © 2017 [facility] Information is for End User's use only and may not be sold, redistributed or otherwise used for commercial purposes  All illustrations and images included in CareNotes® are the copyrighted property of [employer]  or [employer]  The above information is an  only  It is not intended as medical advice for individual conditions or treatments  Talk to your doctor, nurse or pharmacist before following any medical regimen to see if it is safe and effective for you

## 2017-11-29 NOTE — OCCUPATIONAL THERAPY NOTE
Occupational Therapy Progress Note         Patient Name: Tahir VALENZUELA Date: 11/29/2017 11/29/17 3966   Restrictions/Precautions   Weight Bearing Precautions Per Order No   Other Precautions Telemetry; Fall Risk;Cognitive   Pain Assessment   Pain Assessment No/denies pain   ADL   Where Assessed Sitting at sink   Eating Assistance 7  Independent   Grooming Assistance 5  Supervision/Setup   UB Bathing Assistance 5  Supervision/Setup   LB Bathing Assistance 4  Minimal Assistance   LB Bathing Deficit Verbal cueing   UB Dressing Assistance 5  Supervision/Setup   UB Dressing Deficit Verbal cueing   LB Dressing Assistance 4  Minimal Assistance   LB Dressing Deficit Increased time to complete; Don/doff R sock; Don/doff L sock; Verbal cueing   LB Dressing Comments 2* to weakness    Toileting Assistance  5  Supervision/Setup   Toileting Deficit Increased time to complete   Functional Standing Tolerance   Time tolerates up to 3 min of standing    Activity during bathing and grooming tasks at sink   Bed Mobility   Rolling R Unable to assess  (Pt oob in recline )   Transfers   Sit to Stand 5  Supervision   Additional items Assist x 1;Verbal cues   Stand to Sit 5  Supervision   Additional items Assist x 1;Verbal cues   Functional Mobility   Functional Mobility 4  Minimal assistance  (cga)   Therapeutic Excerise-Strength   UE Strength Yes   Right Upper Extremity- Strength   R Shoulder Flexion; Extension;Horizontal ABduction   R Elbow Elbow flexion;Elbow extension   R Wrist Wrist flexion;Wrist extension   R Hand Thumb; Index finger; Long finger;Ring finger;Little finger   Equipment Other (Comment)  (AROM)   R Weight/Reps/Sets 2x15 reps   RUE Strength Comment 2+/5 grossly   Neuromuscular Education   Weight Bearing Technique Yes   RUE Weight Bearing Extended arm seated;Forearm seated   Response to Weight Bearing Technique Pt responded well some discomfort noted upon standing    Coordination   Fine Motor impaired RUE Dexterity Impaired RUE   Cognition   Orientation Level Oriented to person;Oriented to place; Disoriented to time   Memory Decreased recall of biographical information;Decreased recall of recent events;Decreased recall of precautions   Following Commands Follows multistep commands with increased time or repetition   Activity Tolerance   Activity Tolerance Patient tolerated treatment well;Patient limited by fatigue   Medical Staff Made Aware Yane BAZAN pt appropriate to see for OT sessions,    Assessment   Assessment Patient participated in Skilled OT session this date with interventions consisting of ADL re training with the use of correct body mechnaics, Energy Conservation techniques, Work simplification skills , deep breathing technique, safety awareness and fall prevention techniques, therapeutic exercise to: increase functional use of BUEs, increase BUE muscle strength ,  therapeutic activities to: increase activity tolerance, increase standing tolerance time with unilateral UE support to complete sink level ADLs and increase dynamic sit/ stand balance during functional activity   Pt participated in ROM exercises 2x15 reps each plane  NMR/WB techniques to normalize movement RUE  Patient agreeable to OT treatment session, upon arrival patient was found seated OOB to Recliner  In comparison to previous session, patient with improvements in UB/LB dressing and bathing, functional ADL transfers and functional mobility  Pt required supervision sba for bathing, min A for LB dressing, and functional mobility mobility in room requiring cga w/o device  Pt tolerated standing up 4 min at sink  Patient requiring verbal cues for safety, verbal cues for correct technique and cognitive assistance to anticipate next step  Patient continues to be functioning below baseline level, occupational performance remains limited secondary to factors listed above and increased risk for falls and injury     From OT standpoint, recommendation at time of d/c would be Short Term Rehab  Patient to benefit from continued Occupational Therapy treatment while in the hospital to address deficits as defined above and maximize level of functional independence with ADLs and functional mobility  Plan   Treatment Interventions ADL retraining;Functional transfer training;UE strengthening/ROM; Endurance training;Energy conservation; Activityengagement   Goal Expiration Date 12/11/17   Treatment Day 2   OT Frequency 3-5x/wk   Recommendation   OT Discharge Recommendation Short Term Rehab  (IP acute rehab )   OT - OK to Discharge Yes  (IP/Acute/ STR when medically cleared)   Barthel Index   Feeding 10   Bathing 0   Grooming Score 5   Dressing Score 5   Bladder Score 10   Bowels Score 10   Toilet Use Score 10   Transfers (Bed/Chair) Score 10   Mobility (Level Surface) Score 0   Stairs Score 0   Barthel Index Score 60

## 2017-11-29 NOTE — PLAN OF CARE
DISCHARGE PLANNING     Discharge to home or other facility with appropriate resources Progressing        DISCHARGE PLANNING - CARE MANAGEMENT     Discharge to post-acute care or home with appropriate resources Progressing        INFECTION - ADULT     Absence or prevention of progression during hospitalization Progressing        Knowledge Deficit     Patient/family/caregiver demonstrates understanding of disease process, treatment plan, medications, and discharge instructions Progressing        METABOLIC, FLUID AND ELECTROLYTES - ADULT     Electrolytes maintained within normal limits Progressing     Fluid balance maintained Progressing     Glucose maintained within target range Progressing        NEUROSENSORY - ADULT     Achieves stable or improved neurological status Progressing     Achieves maximal functionality and self care Progressing        PAIN - ADULT     Verbalizes/displays adequate comfort level or baseline comfort level Progressing        Potential for Falls     Patient will remain free of falls Progressing        Prexisting or High Potential for Compromised Skin Integrity     Skin integrity is maintained or improved Progressing        SAFETY ADULT     Patient will remain free of falls Progressing     Maintain or return to baseline ADL function Progressing     Maintain or return mobility status to optimal level Progressing

## 2017-11-30 LAB — GBM AB SER IA-ACNC: 5 UNITS (ref 0–20)

## 2017-12-01 ENCOUNTER — GENERIC CONVERSION - ENCOUNTER (OUTPATIENT)
Dept: OTHER | Facility: OTHER | Age: 57
End: 2017-12-01

## 2017-12-01 LAB
C-ANCA TITR SER IF: NORMAL TITER
MYELOPEROXIDASE AB SER IA-ACNC: <9 U/ML (ref 0–9)
P-ANCA ATYPICAL TITR SER IF: NORMAL TITER
P-ANCA TITR SER IF: NORMAL TITER
PROTEINASE3 AB SER IA-ACNC: <3.5 U/ML (ref 0–3.5)

## 2017-12-04 ENCOUNTER — GENERIC CONVERSION - ENCOUNTER (OUTPATIENT)
Dept: OTHER | Facility: OTHER | Age: 57
End: 2017-12-04

## 2017-12-06 LAB — CRYOGLOB SER QL 1D COLD INC: NORMAL

## 2017-12-07 ENCOUNTER — GENERIC CONVERSION - ENCOUNTER (OUTPATIENT)
Dept: OTHER | Facility: OTHER | Age: 57
End: 2017-12-07

## 2018-01-12 NOTE — RESULT NOTES
Verified Results  (1) ANTI GBM [id number] 11:01AM Loc Felipe     Test Name Result Flag Reference   ANTIGLOMERULAR BM AB,QN 5 units  0 - 20   Negative                   0 - 20                     Weak Positive             21 - 30                     Moderate to Strong Positive   >30  Performed at:  [address]  [id number]  : Osman Ramos MD, Phone:  [phone]

## 2018-01-23 NOTE — RESULT NOTES
Verified Results  (1) NEUTROPHIL CYTOPLASMIN ANTIBODY [id number] 11:01AM Troy Harding     Test Name Result Flag Reference   CYTOPLAS-C ANCA <1:20 titer  Neg:<1:20   ATYPICAL ANCA <1:20 titer  Neg:<1:20   The atypical pANCA pattern has been observed in a significant  percentage of patients with ulcerative colitis, primary sclerosing  cholangitis and autoimmune hepatitis  MPO ANTIBODY <9 0 U/mL  0 0 - 9 0   WY-3 ANTIBODY <3 5 U/mL  0 0 - 3 5   P-ANCA <1:20 titer  Neg:<1:20   The presence of positive fluorescence exhibiting P-ANCA or C-ANCA  patterns alone is not specific for the diagnosis of Wegener's  Granulomatosis (WG) or microscopic polyangiitis  Decisions about  treatment should not be based solely on ANCA IFA results  The  International ANCA Group Consensus recommends follow up testing of  positive sera with both WY-3 and MPO-ANCA enzyme immunoassays  As  many as 5% serum samples are positive only by EIA  Ref  AM J Clin Pathol 1999;111:507-513    Performed at:  [address]  [id number]  : Nicole Marcelino MD, Phone:  [phone]

## 2018-01-23 NOTE — RESULT NOTES
Verified Results  (1) CRYOGLOBULIN [id number] 11:01AM Ramakrishna Young     Test Name Result Flag Reference   CRYOGLOBULIN Comment  None detected   None Detected at 72 hours  This test was developed and its performance characteristics  determined by [facility]  It has not been cleared or approved  by the Food and Drug Administration    Performed at:  [address] [place] [address]  [id number]  : Genesis Cruz MD, Phone:  [phone]

## 2018-01-27 ENCOUNTER — APPOINTMENT (EMERGENCY)
Dept: ULTRASOUND IMAGING | Facility: HOSPITAL | Age: 58
End: 2018-01-27
Payer: COMMERCIAL

## 2018-01-27 ENCOUNTER — HOSPITAL ENCOUNTER (EMERGENCY)
Facility: HOSPITAL | Age: 58
Discharge: HOME | End: 2018-01-27
Payer: COMMERCIAL

## 2018-01-27 VITALS
WEIGHT: 275 LBS | TEMPERATURE: 98 F | RESPIRATION RATE: 18 BRPM | BODY MASS INDEX: 38.5 KG/M2 | HEIGHT: 71 IN | HEART RATE: 81 BPM | SYSTOLIC BLOOD PRESSURE: 117 MMHG | DIASTOLIC BLOOD PRESSURE: 75 MMHG | OXYGEN SATURATION: 100 %

## 2018-01-27 DIAGNOSIS — M79.606 LEG PAIN: Primary | ICD-10-CM

## 2018-01-27 LAB
ANION GAP SERPL CALCULATED.3IONS-SCNC: 15 MMOL/L (ref 4–13)
BASOPHILS # BLD AUTO: 0.05 THOUSANDS/ΜL (ref 0–0.1)
BASOPHILS NFR BLD AUTO: 0 % (ref 0–1)
BUN SERPL-MCNC: 81 MG/DL (ref 5–25)
CALCIUM SERPL-MCNC: 9.4 MG/DL (ref 8.3–10.1)
CHLORIDE SERPL-SCNC: 100 MMOL/L (ref 100–108)
CO2 SERPL-SCNC: 22 MMOL/L (ref 21–32)
CREAT SERPL-MCNC: 7.48 MG/DL (ref 0.6–1.3)
EOSINOPHIL # BLD AUTO: 0.14 THOUSAND/ΜL (ref 0–0.61)
EOSINOPHIL NFR BLD AUTO: 1 % (ref 0–6)
ERYTHROCYTE [DISTWIDTH] IN BLOOD BY AUTOMATED COUNT: 14 % (ref 11.6–15.1)
GFR SERPL CREATININE-BSD FRML MDRD: 6 ML/MIN/1.73SQ M
GLUCOSE SERPL-MCNC: 309 MG/DL (ref 65–140)
HCT VFR BLD AUTO: 33.2 % (ref 34.8–46.1)
HGB BLD-MCNC: 10.5 G/DL (ref 11.5–15.4)
LYMPHOCYTES # BLD AUTO: 2.99 THOUSANDS/ΜL (ref 0.6–4.47)
LYMPHOCYTES NFR BLD AUTO: 22 % (ref 14–44)
MCH RBC QN AUTO: 29 PG (ref 26.8–34.3)
MCHC RBC AUTO-ENTMCNC: 31.6 G/DL (ref 31.4–37.4)
MCV RBC AUTO: 92 FL (ref 82–98)
MONOCYTES # BLD AUTO: 0.63 THOUSAND/ΜL (ref 0.17–1.22)
MONOCYTES NFR BLD AUTO: 5 % (ref 4–12)
NEUTROPHILS # BLD AUTO: 9.67 THOUSANDS/ΜL (ref 1.85–7.62)
NEUTS SEG NFR BLD AUTO: 71 % (ref 43–75)
NRBC BLD AUTO-RTO: 0 /100 WBCS
PLATELET # BLD AUTO: 242 THOUSANDS/UL (ref 149–390)
PMV BLD AUTO: 9.9 FL (ref 8.9–12.7)
POTASSIUM SERPL-SCNC: 4.8 MMOL/L (ref 3.5–5.3)
RBC # BLD AUTO: 3.62 MILLION/UL (ref 3.81–5.12)
SODIUM SERPL-SCNC: 137 MMOL/L (ref 136–145)
WBC # BLD AUTO: 13.55 THOUSAND/UL (ref 4.31–10.16)

## 2018-01-27 PROCEDURE — 93971 EXTREMITY STUDY: CPT

## 2018-01-27 PROCEDURE — 99284 EMERGENCY DEPT VISIT MOD MDM: CPT

## 2018-01-27 PROCEDURE — 85025 COMPLETE CBC W/AUTO DIFF WBC: CPT | Performed by: PHYSICIAN ASSISTANT

## 2018-01-27 PROCEDURE — 80048 BASIC METABOLIC PNL TOTAL CA: CPT | Performed by: PHYSICIAN ASSISTANT

## 2018-01-27 PROCEDURE — 36415 COLL VENOUS BLD VENIPUNCTURE: CPT | Performed by: PHYSICIAN ASSISTANT

## 2018-01-27 RX ORDER — GABAPENTIN 100 MG/1
100 CAPSULE ORAL 3 TIMES DAILY
COMMUNITY

## 2018-01-27 RX ORDER — ACETAMINOPHEN 325 MG/1
650 TABLET ORAL ONCE
Status: COMPLETED | OUTPATIENT
Start: 2018-01-27 | End: 2018-01-27

## 2018-01-27 RX ADMIN — ACETAMINOPHEN 650 MG: 325 TABLET ORAL at 22:59

## 2018-01-28 PROCEDURE — 93971 EXTREMITY STUDY: CPT | Performed by: SURGERY

## 2018-01-28 NOTE — ED NOTES
Pt reports taking a medication provided by friend at bedside  At first pts friend at bedside reports the medication starts with an "r"  After looking at medication list pt and friend think he gave her gabapentin 100mg  Pt denies pain at this time, and is unable to describe location and type of pain when she had the pain        Gilberto Phoenix, RN  01/27/18 2001

## 2018-01-28 NOTE — DISCHARGE INSTRUCTIONS
Leg Pain   WHAT YOU NEED TO KNOW:   Leg pain may be caused by a variety of health conditions  Your tests did not show any broken bones or blood clots  DISCHARGE INSTRUCTIONS:   Return to the emergency department if:   · You have a fever  · Your leg starts to swell  · Your leg pain gets worse  · You have numbness or tingling in your leg or toes  · You cannot put any weight on or move your leg  Contact your healthcare provider if:   · Your pain does not decrease, even after treatment  · You have questions or concerns about your condition or care  Medicines:   · NSAIDs , such as ibuprofen, help decrease swelling, pain, and fever  This medicine is available with or without a doctor's order  NSAIDs can cause stomach bleeding or kidney problems in certain people  If you take blood thinner medicine, always ask your healthcare provider if NSAIDs are safe for you  Always read the medicine label and follow directions  · Take your medicine as directed  Contact your healthcare provider if you think your medicine is not helping or if you have side effects  Tell him of her if you are allergic to any medicine  Keep a list of the medicines, vitamins, and herbs you take  Include the amounts, and when and why you take them  Bring the list or the pill bottles to follow-up visits  Carry your medicine list with you in case of an emergency  Follow up with your healthcare provider as directed: You may need more tests to find the cause of your leg pain  You may need to see an orthopedic specialist or a physical therapist  Write down your questions so you remember to ask them during your visits  Manage your leg pain:   · Rest  your injured leg so that it can heal  You may need an immobilizer, brace, or splint to limit the movement of your leg  You may need to avoid putting any weight on your leg for at least 48 hours  Return to normal activities as directed      · Ice  the injury for 20 minutes every 4 hours for up to 24 hours, or as directed  Use an ice pack, or put crushed ice in a plastic bag  Cover it with a towel to protect your skin  Ice helps prevent tissue damage and decreases swelling and pain  · Elevate  your injured leg above the level of your heart as often as you can  This will help decrease swelling and pain  If possible, prop your leg on pillows or blankets to keep the area elevated comfortably  · Use assistive devices as directed  You may need to use a cane or crutches  Assistive devices help decrease pain and pressure on your leg when you walk  Ask your healthcare provider for more information about assistive devices and how to use them correctly  · Maintain a healthy weight  Extra body weight can cause pressure and pain in your hip, knee, and ankle joints  Ask your healthcare provider how much you should weigh  Ask him to help you create a weight loss plan if you are overweight  © 2017 [address] Information is for End User's use only and may not be sold, redistributed or otherwise used for commercial purposes  All illustrations and images included in CareNotes® are the copyrighted property of [employer]  or [employer]  The above information is an  only  It is not intended as medical advice for individual conditions or treatments  Talk to your doctor, nurse or pharmacist before following any medical regimen to see if it is safe and effective for you

## 2018-01-28 NOTE — ED NOTES
Ultrasound at bedside     Maksim Tavera [facility]  01/27/18 2010

## 2018-01-28 NOTE — ED PROVIDER NOTES
History  Chief Complaint   Patient presents with    Foot Pain     Pt c/o neuropathy in right foot  Jim Adams is a 59 y o  female w PMH CKD, DM2, HTN, HLD who presents for evaluation of RLE pain  R sided leg pain that began earlier today then subsided, no pain now  Denies trauma / injury  No numbness / paresthesias  No f/c  Able to ambulate  Thought it was neuropathy bc she has DM but has never suffered from neuropathy before  Prior to Admission Medications   Prescriptions Last Dose Informant Patient Reported? Taking? amLODIPine (NORVASC) 10 mg tablet   No Yes   Sig: Take 1 tablet by mouth daily for 30 days   aspirin 81 mg chewable tablet   No Yes   Sig: Chew 1 tablet daily for 30 days   atorvastatin (LIPITOR) 80 mg tablet   No Yes   Sig: Take 1 tablet by mouth daily with dinner for 30 days   calcitriol (ROCALTROL) 0 25 mcg capsule   No Yes   Sig: Take 1 capsule by mouth daily for 30 days   carvedilol (COREG) 12 5 mg tablet   No Yes   Sig: Take 1 tablet by mouth 2 (two) times a day with meals for 30 days   clopidogrel (PLAVIX) 75 mg tablet   No Yes   Sig: Take 1 tablet by mouth daily for 30 days   gabapentin (NEURONTIN) 100 mg capsule 1/27/2018 at Unknown time  Yes Yes   Sig: Take 100 mg by mouth 3 (three) times a day   insulin detemir (LEVEMIR) 100 units/mL subcutaneous injection   Yes Yes   Sig: Inject 10 Units under the skin daily at bedtime   insulin lispro (HumaLOG) 100 units/mL injection   Yes Yes   Sig: Inject under the skin 3 (three) times a day before meals      Facility-Administered Medications: None       Past Medical History:   Diagnosis Date    CKD (chronic kidney disease)     Diabetes mellitus ([facility] [address] )     type II    Hyperlipidemia     Hypertension     [facility]       History reviewed  No pertinent surgical history      Family History   Problem Relation Age of Onset    Kidney failure Sister      I have reviewed and agree with the history as documented  Social History   Substance Use Topics    Smoking status: Former Smoker    Smokeless tobacco: Never Used    Alcohol use No        Review of Systems   Constitutional: Negative for chills, diaphoresis and fever  HENT: Negative for congestion and sore throat  Eyes: Negative for visual disturbance  Respiratory: Negative for cough, chest tightness, shortness of breath and wheezing  Cardiovascular: Negative for chest pain and leg swelling  Gastrointestinal: Negative for abdominal pain, constipation, diarrhea, nausea and vomiting  Genitourinary: Negative for difficulty urinating, dysuria, frequency, hematuria, urgency, vaginal bleeding, vaginal discharge and vaginal pain  Musculoskeletal: Positive for myalgias  Negative for arthralgias  Neurological: Negative for dizziness, weakness, light-headedness, numbness and headaches  Psychiatric/Behavioral: The patient is not nervous/anxious  Physical Exam  ED Triage Vitals [01/27/18 1838]   Temperature Pulse Respirations Blood Pressure SpO2   98 °F (36 7 °C) 81 18 117/75 100 %      Temp Source Heart Rate Source Patient Position - Orthostatic VS BP Location FiO2 (%)   Oral Monitor Sitting Left arm --      Pain Score       --           Orthostatic Vital Signs  Vitals:    01/27/18 1838   BP: 117/75   Pulse: 81   Patient Position - Orthostatic VS: Sitting       Physical Exam   Constitutional: She is oriented to person, place, and time  She appears well-developed and well-nourished  No distress  HENT:   Head: Normocephalic and atraumatic  Right Ear: External ear normal    Left Ear: External ear normal    Eyes: Pupils are equal, round, and reactive to light  Neck: Normal range of motion  Neck supple  No tracheal deviation present  Cardiovascular: Normal rate, regular rhythm, normal heart sounds and intact distal pulses  Exam reveals no gallop and no friction rub  No murmur heard    Pulmonary/Chest: Effort normal and breath sounds normal  No respiratory distress  She has no wheezes  She has no rales  Abdominal: Soft  Bowel sounds are normal  She exhibits no distension and no mass  There is no tenderness  There is no guarding  Musculoskeletal: She exhibits no edema or deformity  Mild pain of the posterior medial calf to palpation  The compartments are soft  nv intact distally   Normal from   No pain w active / prom   Normal pulses / cap refill / sensation    Neurological: She is alert and oriented to person, place, and time  Skin: Skin is warm and dry  She is not diaphoretic  Psychiatric: She has a normal mood and affect  Her behavior is normal    Nursing note and vitals reviewed  ED Medications  Medications - No data to display    Diagnostic Studies  Results Reviewed     Procedure Component Value Units Date/Time    CBC and differential [[id number]]  (Abnormal) Collected:  01/27/18 2229    Lab Status:  Final result Specimen:  Blood Updated:  01/27/18 2235     WBC 13 55 (H) Thousand/uL      RBC 3 62 (L) Million/uL      Hemoglobin 10 5 (L) g/dL      Hematocrit 33 2 (L) %      MCV 92 fL      MCH 29 0 pg      MCHC 31 6 g/dL      RDW 14 0 %      MPV 9 9 fL      Platelets 536 Thousands/uL      nRBC 0 /100 WBCs      Neutrophils Relative 71 %      Lymphocytes Relative 22 %      Monocytes Relative 5 %      Eosinophils Relative 1 %      Basophils Relative 0 %      Neutrophils Absolute 9 67 (H) Thousands/µL      Lymphocytes Absolute 2 99 Thousands/µL      Monocytes Absolute 0 63 Thousand/µL      Eosinophils Absolute 0 14 Thousand/µL      Basophils Absolute 0 05 Thousands/µL     Basic metabolic panel [[id number]] Collected:  01/27/18 2232    Lab Status:   In process Specimen:  Blood Updated:  01/27/18 2232                 VAS lower limb venous duplex study, unilateral/limited    (Results Pending)   VAS lower limb venous duplex study, unilateral/limited    (Results Pending)              Procedures  Procedures       Phone Contacts  ED Phone Contact    ED Course  ED Course                                MDM  Number of Diagnoses or Management Options  Leg pain:   Diagnosis management comments: DDX includes but not ltd to:   Doubt but consider DVT   No sx of developing infection  Not consistent w compartment syndrome, fx    Plan is to obtain:  VAS duplex RLE   CBC to check for anemia, leukocytosis, hydration status  Chemistry panel to check for lyte abnormalities, organ function     Based on results:  Normal VAS duplex as per ED tech wo clot   She was given script initially for DVT study as outpt as was anxious to leave ED but then changed her mind and wanted to stay  The remaining order can be d/c     Return parameters discussed  Pt requires f/u as an outpt  Pt expresses understanding w above treatment plan  All questions answered prior to d/c  Portions of the record may have been created with voice recognition software   Occasional wrong word or "sound a like" substitutions may have occurred due to the inherent limitations of voice recognition software   Read the chart carefully and recognize, using context, where substitutions have occurred  CritCare Time    Disposition  Final diagnoses:   Leg pain     Time reflects when diagnosis was documented in both MDM as applicable and the Disposition within this note     Time User Action Codes Description Comment    1/27/2018  9:08 PM Kristen Adjutant Add [M79 [id number]] Leg pain       ED Disposition     ED Disposition Condition Comment    Discharge  Brittany Buckner discharge to home/self care      Condition at discharge: Good        Follow-up Information     Follow up With Specialties Details Why Contact Info Additional Information    [address] [place] [address] Emergency Department Emergency Medicine  If symptoms worsen [address]  [phone] [place] [facility], [address] [place], [place], [address]ud[address] PA-C Physician Assistant  As needed [address] [place] [address] [address] [facility]  [phone]           Patient's Medications   Discharge Prescriptions    No medications on file       Outpatient Discharge Orders  VAS lower limb venous duplex study, unilateral/limited   Standing Status: Future  Standing Exp   Date: 01/27/22         ED Provider  Electronically Signed by           Kari Blackwell PA-C  01/27/18 [facility]  01/27/18 0098